# Patient Record
Sex: FEMALE | Race: OTHER | HISPANIC OR LATINO
[De-identification: names, ages, dates, MRNs, and addresses within clinical notes are randomized per-mention and may not be internally consistent; named-entity substitution may affect disease eponyms.]

---

## 2017-07-05 ENCOUNTER — TRANSCRIPTION ENCOUNTER (OUTPATIENT)
Age: 67
End: 2017-07-05

## 2018-07-23 ENCOUNTER — APPOINTMENT (OUTPATIENT)
Dept: OPHTHALMOLOGY | Facility: CLINIC | Age: 68
End: 2018-07-23
Payer: MEDICARE

## 2018-07-23 PROCEDURE — 92004 COMPRE OPH EXAM NEW PT 1/>: CPT

## 2018-12-03 ENCOUNTER — APPOINTMENT (OUTPATIENT)
Dept: OPHTHALMOLOGY | Facility: CLINIC | Age: 68
End: 2018-12-03
Payer: MEDICAID

## 2018-12-03 PROCEDURE — 92014 COMPRE OPH EXAM EST PT 1/>: CPT

## 2018-12-03 PROCEDURE — 92225: CPT | Mod: LT

## 2019-01-28 ENCOUNTER — INPATIENT (INPATIENT)
Facility: HOSPITAL | Age: 69
LOS: 4 days | Discharge: HOME | DRG: 301 | End: 2019-02-02
Attending: INTERNAL MEDICINE | Admitting: INTERNAL MEDICINE
Payer: MEDICARE

## 2019-01-28 VITALS
TEMPERATURE: 98 F | DIASTOLIC BLOOD PRESSURE: 77 MMHG | HEIGHT: 61 IN | WEIGHT: 199.96 LBS | SYSTOLIC BLOOD PRESSURE: 122 MMHG | RESPIRATION RATE: 18 BRPM | OXYGEN SATURATION: 99 % | HEART RATE: 90 BPM

## 2019-01-28 DIAGNOSIS — S99.919A UNSPECIFIED INJURY OF UNSPECIFIED ANKLE, INITIAL ENCOUNTER: ICD-10-CM

## 2019-01-28 DIAGNOSIS — R42 DIZZINESS AND GIDDINESS: ICD-10-CM

## 2019-01-28 DIAGNOSIS — Z29.9 ENCOUNTER FOR PROPHYLACTIC MEASURES, UNSPECIFIED: ICD-10-CM

## 2019-01-28 LAB
ALBUMIN SERPL ELPH-MCNC: 3.6 G/DL — SIGNIFICANT CHANGE UP (ref 3.5–5)
ALP SERPL-CCNC: 137 U/L — HIGH (ref 40–120)
ALT FLD-CCNC: 25 U/L DA — SIGNIFICANT CHANGE UP (ref 10–60)
ANION GAP SERPL CALC-SCNC: 8 MMOL/L — SIGNIFICANT CHANGE UP (ref 5–17)
ANION GAP SERPL CALC-SCNC: 8 MMOL/L — SIGNIFICANT CHANGE UP (ref 5–17)
APPEARANCE UR: CLEAR — SIGNIFICANT CHANGE UP
AST SERPL-CCNC: 42 U/L — HIGH (ref 10–40)
BASOPHILS # BLD AUTO: 0 K/UL — SIGNIFICANT CHANGE UP (ref 0–0.2)
BASOPHILS NFR BLD AUTO: 0.7 % — SIGNIFICANT CHANGE UP (ref 0–2)
BILIRUB SERPL-MCNC: 0.5 MG/DL — SIGNIFICANT CHANGE UP (ref 0.2–1.2)
BILIRUB UR-MCNC: NEGATIVE — SIGNIFICANT CHANGE UP
BUN SERPL-MCNC: 6 MG/DL — LOW (ref 7–18)
BUN SERPL-MCNC: 8 MG/DL — SIGNIFICANT CHANGE UP (ref 7–18)
CALCIUM SERPL-MCNC: 8 MG/DL — LOW (ref 8.4–10.5)
CALCIUM SERPL-MCNC: 8.8 MG/DL — SIGNIFICANT CHANGE UP (ref 8.4–10.5)
CHLORIDE SERPL-SCNC: 105 MMOL/L — SIGNIFICANT CHANGE UP (ref 96–108)
CHLORIDE SERPL-SCNC: 97 MMOL/L — SIGNIFICANT CHANGE UP (ref 96–108)
CO2 SERPL-SCNC: 23 MMOL/L — SIGNIFICANT CHANGE UP (ref 22–31)
CO2 SERPL-SCNC: 23 MMOL/L — SIGNIFICANT CHANGE UP (ref 22–31)
COLOR SPEC: YELLOW — SIGNIFICANT CHANGE UP
CREAT SERPL-MCNC: 0.5 MG/DL — SIGNIFICANT CHANGE UP (ref 0.5–1.3)
CREAT SERPL-MCNC: 0.66 MG/DL — SIGNIFICANT CHANGE UP (ref 0.5–1.3)
D DIMER BLD IA.RAPID-MCNC: 335 NG/ML DDU — HIGH
DIFF PNL FLD: NEGATIVE — SIGNIFICANT CHANGE UP
EOSINOPHIL # BLD AUTO: 0.3 K/UL — SIGNIFICANT CHANGE UP (ref 0–0.5)
EOSINOPHIL NFR BLD AUTO: 5.6 % — SIGNIFICANT CHANGE UP (ref 0–6)
FLU A RESULT: DETECTED
FLU A RESULT: DETECTED
FLUAV AG NPH QL: DETECTED
FLUBV AG NPH QL: SIGNIFICANT CHANGE UP
GLUCOSE SERPL-MCNC: 92 MG/DL — SIGNIFICANT CHANGE UP (ref 70–99)
GLUCOSE SERPL-MCNC: 97 MG/DL — SIGNIFICANT CHANGE UP (ref 70–99)
GLUCOSE UR QL: NEGATIVE — SIGNIFICANT CHANGE UP
HCT VFR BLD CALC: 41.4 % — SIGNIFICANT CHANGE UP (ref 34.5–45)
HGB BLD-MCNC: 13.9 G/DL — SIGNIFICANT CHANGE UP (ref 11.5–15.5)
KETONES UR-MCNC: NEGATIVE — SIGNIFICANT CHANGE UP
LEUKOCYTE ESTERASE UR-ACNC: NEGATIVE — SIGNIFICANT CHANGE UP
LIDOCAIN IGE QN: 118 U/L — SIGNIFICANT CHANGE UP (ref 73–393)
LYMPHOCYTES # BLD AUTO: 2.2 K/UL — SIGNIFICANT CHANGE UP (ref 1–3.3)
LYMPHOCYTES # BLD AUTO: 39.4 % — SIGNIFICANT CHANGE UP (ref 13–44)
MCHC RBC-ENTMCNC: 30 PG — SIGNIFICANT CHANGE UP (ref 27–34)
MCHC RBC-ENTMCNC: 33.5 GM/DL — SIGNIFICANT CHANGE UP (ref 32–36)
MCV RBC AUTO: 89.7 FL — SIGNIFICANT CHANGE UP (ref 80–100)
MONOCYTES # BLD AUTO: 0.4 K/UL — SIGNIFICANT CHANGE UP (ref 0–0.9)
MONOCYTES NFR BLD AUTO: 7.2 % — SIGNIFICANT CHANGE UP (ref 2–14)
NEUTROPHILS # BLD AUTO: 2.7 K/UL — SIGNIFICANT CHANGE UP (ref 1.8–7.4)
NEUTROPHILS NFR BLD AUTO: 47.1 % — SIGNIFICANT CHANGE UP (ref 43–77)
NITRITE UR-MCNC: NEGATIVE — SIGNIFICANT CHANGE UP
PH UR: 7 — SIGNIFICANT CHANGE UP (ref 5–8)
PLATELET # BLD AUTO: 288 K/UL — SIGNIFICANT CHANGE UP (ref 150–400)
POTASSIUM SERPL-MCNC: 3.9 MMOL/L — SIGNIFICANT CHANGE UP (ref 3.5–5.3)
POTASSIUM SERPL-MCNC: 5.6 MMOL/L — HIGH (ref 3.5–5.3)
POTASSIUM SERPL-SCNC: 3.9 MMOL/L — SIGNIFICANT CHANGE UP (ref 3.5–5.3)
POTASSIUM SERPL-SCNC: 5.6 MMOL/L — HIGH (ref 3.5–5.3)
PROT SERPL-MCNC: 7.7 G/DL — SIGNIFICANT CHANGE UP (ref 6–8.3)
PROT UR-MCNC: NEGATIVE — SIGNIFICANT CHANGE UP
RBC # BLD: 4.62 M/UL — SIGNIFICANT CHANGE UP (ref 3.8–5.2)
RBC # FLD: 12.2 % — SIGNIFICANT CHANGE UP (ref 10.3–14.5)
RSV RESULT: SIGNIFICANT CHANGE UP
RSV RNA RESP QL NAA+PROBE: SIGNIFICANT CHANGE UP
SODIUM SERPL-SCNC: 128 MMOL/L — LOW (ref 135–145)
SODIUM SERPL-SCNC: 136 MMOL/L — SIGNIFICANT CHANGE UP (ref 135–145)
SP GR SPEC: 1.01 — SIGNIFICANT CHANGE UP (ref 1.01–1.02)
TROPONIN I SERPL-MCNC: <0.015 NG/ML — SIGNIFICANT CHANGE UP (ref 0–0.04)
TSH SERPL-MCNC: 2.36 UU/ML — SIGNIFICANT CHANGE UP (ref 0.34–4.82)
UROBILINOGEN FLD QL: NEGATIVE — SIGNIFICANT CHANGE UP
WBC # BLD: 5.7 K/UL — SIGNIFICANT CHANGE UP (ref 3.8–10.5)
WBC # FLD AUTO: 5.7 K/UL — SIGNIFICANT CHANGE UP (ref 3.8–10.5)

## 2019-01-28 PROCEDURE — 93971 EXTREMITY STUDY: CPT | Mod: 26,RT

## 2019-01-28 PROCEDURE — 99285 EMERGENCY DEPT VISIT HI MDM: CPT

## 2019-01-28 PROCEDURE — 93010 ELECTROCARDIOGRAM REPORT: CPT

## 2019-01-28 PROCEDURE — 71045 X-RAY EXAM CHEST 1 VIEW: CPT | Mod: 26

## 2019-01-28 RX ORDER — SODIUM CHLORIDE 9 MG/ML
1000 INJECTION INTRAMUSCULAR; INTRAVENOUS; SUBCUTANEOUS
Qty: 0 | Refills: 0 | Status: DISCONTINUED | OUTPATIENT
Start: 2019-01-28 | End: 2019-01-31

## 2019-01-28 RX ORDER — PANTOPRAZOLE SODIUM 20 MG/1
40 TABLET, DELAYED RELEASE ORAL
Qty: 0 | Refills: 0 | Status: DISCONTINUED | OUTPATIENT
Start: 2019-01-28 | End: 2019-02-02

## 2019-01-28 RX ORDER — ACETAMINOPHEN 500 MG
650 TABLET ORAL EVERY 6 HOURS
Qty: 0 | Refills: 0 | Status: DISCONTINUED | OUTPATIENT
Start: 2019-01-28 | End: 2019-02-02

## 2019-01-28 RX ORDER — SODIUM CHLORIDE 9 MG/ML
1000 INJECTION INTRAMUSCULAR; INTRAVENOUS; SUBCUTANEOUS ONCE
Qty: 0 | Refills: 0 | Status: COMPLETED | OUTPATIENT
Start: 2019-01-28 | End: 2019-01-28

## 2019-01-28 RX ORDER — ONDANSETRON 8 MG/1
4 TABLET, FILM COATED ORAL ONCE
Qty: 0 | Refills: 0 | Status: COMPLETED | OUTPATIENT
Start: 2019-01-28 | End: 2019-01-28

## 2019-01-28 RX ORDER — LEVOTHYROXINE SODIUM 125 MCG
75 TABLET ORAL DAILY
Qty: 0 | Refills: 0 | Status: DISCONTINUED | OUTPATIENT
Start: 2019-01-28 | End: 2019-02-02

## 2019-01-28 RX ORDER — HEPARIN SODIUM 5000 [USP'U]/ML
5000 INJECTION INTRAVENOUS; SUBCUTANEOUS EVERY 8 HOURS
Qty: 0 | Refills: 0 | Status: DISCONTINUED | OUTPATIENT
Start: 2019-01-28 | End: 2019-01-29

## 2019-01-28 RX ORDER — ASPIRIN/CALCIUM CARB/MAGNESIUM 324 MG
81 TABLET ORAL DAILY
Qty: 0 | Refills: 0 | Status: DISCONTINUED | OUTPATIENT
Start: 2019-01-28 | End: 2019-02-01

## 2019-01-28 RX ADMIN — SODIUM CHLORIDE 1000 MILLILITER(S): 9 INJECTION INTRAMUSCULAR; INTRAVENOUS; SUBCUTANEOUS at 21:44

## 2019-01-28 RX ADMIN — SODIUM CHLORIDE 1000 MILLILITER(S): 9 INJECTION INTRAMUSCULAR; INTRAVENOUS; SUBCUTANEOUS at 17:24

## 2019-01-28 RX ADMIN — SODIUM CHLORIDE 1000 MILLILITER(S): 9 INJECTION INTRAMUSCULAR; INTRAVENOUS; SUBCUTANEOUS at 19:12

## 2019-01-28 RX ADMIN — ONDANSETRON 4 MILLIGRAM(S): 8 TABLET, FILM COATED ORAL at 17:24

## 2019-01-28 NOTE — ED PROVIDER NOTE - OBJECTIVE STATEMENT
69 y/o F with a significant PMHx of R ankle injury and no significant PSHx presents to the ED with c/o dizziness x 3 days and SOB, cough, nasal congestion, urinary frequency, low urinary output, nausea, vomiting, and diaphoresis x a few days. Pt states he feels like he's going to pass out. Pt's daughter notes pt had SOB x a few days ago. Pt's family says they suspected flu because pt had cough and nasal congestion, with associated urinary frequency and low urinary output despite adequate PO intake. Pt was diagnosed with a UTI prior to ankle injury but was never treated. Pt reports she also has had nausea, vomiting, and diaphoresis. Pt denies vertiginous sxs, chest pain, or any other complaints. Allergies: penicillin, iodine.

## 2019-01-28 NOTE — H&P ADULT - NSHPLABSRESULTS_GEN_ALL_CORE
CBC Full  -  ( 2019 17:17 )  WBC Count : 5.7 K/uL  Hemoglobin : 13.9 g/dL  Hematocrit : 41.4 %  Platelet Count - Automated : 288 K/uL  Mean Cell Volume : 89.7 fl  Mean Cell Hemoglobin : 30.0 pg  Mean Cell Hemoglobin Concentration : 33.5 gm/dL  Auto Neutrophil # : 2.7 K/uL  Auto Lymphocyte # : 2.2 K/uL  Auto Monocyte # : 0.4 K/uL  Auto Eosinophil # : 0.3 K/uL  Auto Basophil # : 0.0 K/uL  Auto Neutrophil % : 47.1 %  Auto Lymphocyte % : 39.4 %  Auto Monocyte % : 7.2 %  Auto Eosinophil % : 5.6 %  Auto Basophil % : 0.7 %        128<L>  |  97  |  8   ----------------------------<  97  5.6<H>   |  23  |  0.66    Ca    8.8      2019 17:17    TPro  7.7  /  Alb  3.6  /  TBili  0.5  /  DBili  x   /  AST  42<H>  /  ALT  25  /  AlkPhos  137<H>      Urinalysis Basic - ( 2019 16:59 )    Color: Yellow / Appearance: Clear / S.010 / pH: x  Gluc: x / Ketone: Negative  / Bili: Negative / Urobili: Negative   Blood: x / Protein: Negative / Nitrite: Negative   Leuk Esterase: Negative / RBC: x / WBC x   Sq Epi: x / Non Sq Epi: x / Bacteria: x    RADIOLOGY & ADDITIONAL STUDIES (The following images were personally reviewed):    EXAM:  XR CHEST AP OR PA 1V                          PROCEDURE DATE:  2019      INTERPRETATION:  Chest portable.    Clinical History: Shortness of breath.    Comparison: 1/3/2013.    Single AP view submitted.    The evaluation of the cardiomediastinal silhouette is limited on portable   technique.    Low lung volumes are present.  No focal consolidation, effusion or pneumothorax is noted.    Impression:    No acute pulmonary process demonstrated.    EXAM:  US DPLX LWR EXT VEINS LTD RT                          PROCEDURE DATE:  2019      INTERPRETATION:  CLINICAL STATEMENT: Swelling leg.    TECHNIQUE: Ultrasound of right lower extremity deep venous system.    COMPARISON: None.    FINDINGS:  There is color and spectral flow, compression and augmentation of the   common femoral, superficial femoral and popliteal veins.    There is flow in the posterior tibial vein.    IMPRESSION:  No evidence of DVT.

## 2019-01-28 NOTE — ED PROVIDER NOTE - MEDICAL DECISION MAKING DETAILS
67 y/o F with a PMHx of R ankle injury presents to the ED with SOB and dizziness x a few days. Will do cardiac w/u, check D-dimer, give fluids, check chest xray, UA, and culture.

## 2019-01-28 NOTE — ED PROVIDER NOTE - CHPI ED SYMPTOMS POS
DIZZINESS/nasal congestion, cough, urinary frequency, low urinary output, nausea, vomiting, diaphoresis/SHORTNESS OF BREATH

## 2019-01-28 NOTE — H&P ADULT - ASSESSMENT
Patient admitted for shortness of breath significant for possible URI vs pulmonary embolism given recent immobilization after ankle surgery. Patient has iodine contrast allergy, therefore will pursue V/Q scan.

## 2019-01-28 NOTE — H&P ADULT - HISTORY OF PRESENT ILLNESS
69 y/o F with a significant PMHx of R ankle injury and no significant PSHx presents to the ED with complaints of dizziness for 3 days accompanied by SOB, cough, nasal congestion, urinary frequency, low urinary output, nausea, vomiting, and diaphoresis. Pt states he feels like he's going to pass out. Pt's daughter notes pt had SOB x a few days ago. Pt's family says they suspected flu because pt had cough and nasal congestion, with associated urinary frequency and low urinary output despite adequate PO intake. Pt was diagnosed with a UTI prior to ankle injury, however was never treated. Pt reports she also has had nausea, vomiting, and diaphoresis. Pt denies vertiginous sxs, chest pain, or any other complaints. 69 y/o F with a significant PMHx of R ankle injury and no significant PSHx presents to the ED with complaints of dizziness for 3 days accompanied by SOB, cough, nasal congestion, urinary frequency, low urinary output, nausea, vomiting, and diaphoresis. Pt states he feels like he's going to pass out, with a warm sensation traveling from legs to her face. Pt's daughter notes pt had SOB x a few days ago. Pt's family says they suspected flu because pt had cough and nasal congestion, with associated urinary frequency and low urinary output despite adequate PO intake. Pt was diagnosed with a UTI prior to ankle injury, however was never treated. Pt reports she also has had nausea, vomiting, and diaphoresis. Pt denies vertiginous sxs, chest pain, or any other complaints.

## 2019-01-28 NOTE — H&P ADULT - NSHPPHYSICALEXAM_GEN_ALL_CORE
Vital Signs Last 24 Hrs  T(C): 36.7 (28 Jan 2019 19:39), Max: 36.9 (28 Jan 2019 16:03)  T(F): 98 (28 Jan 2019 19:39), Max: 98.4 (28 Jan 2019 16:03)  HR: 95 (28 Jan 2019 19:39) (90 - 95)  BP: 125/74 (28 Jan 2019 19:39) (122/77 - 125/74)  RR: 18 (28 Jan 2019 19:39) (18 - 18)  SpO2: 98% (28 Jan 2019 19:39) (98% - 99%)  ________________________________________________  PHYSICAL EXAM:  GENERAL: NAD  HEENT: Normocephalic;  conjunctivae and sclerae clear; moist mucous membranes  NECK : supple, no JVD  CHEST/LUNG: Clear to auscultation bilaterally with good air entry   HEART: S1 S2  regular; no murmurs, gallops or rubs  ABDOMEN: Soft, Nontender, Nondistended; Bowel sounds present  EXTREMITIES: no cyanosis; no edema; no calf tenderness  NERVOUS SYSTEM:  Awake and alert; Oriented  to place, person and time

## 2019-01-28 NOTE — H&P ADULT - PMH
Ankle injury  right ankle Ankle injury  right ankle  Chronic constipation    Diverticulosis    Gastritis    Hemorrhoids    Hernia    Hypothyroid    THORNE (nonalcoholic steatohepatitis)  stage 2

## 2019-01-28 NOTE — H&P ADULT - PROBLEM SELECTOR PLAN 6
elevated liver enzymes  f/u CMP in AM  dash diet  tylenol PRN only if liver enzymes wnl or trending down

## 2019-01-28 NOTE — H&P ADULT - PSH
No significant past surgical history H/O hysterectomy with unilateral oophorectomy    H/O:     History of ankle surgery  bilateral ankle surgery, 1 month ago had RLE ankle surgery

## 2019-01-28 NOTE — H&P ADULT - NSHPLANGTRANSLATORFT_GEN_A_CORE
Patient prefers daughter at bedside to translate Patient prefers daughter Fabienne at bedside to translate

## 2019-01-28 NOTE — H&P ADULT - PROBLEM SELECTOR PLAN 1
concern for DVT/PE given recent ankle surgery on RLE, near syncope, intermittent shortness of breath, however patient also has upper respiratory symptoms including cough, nasal congestion, and currently positive for influenza A  c/w tamiflu for now x 5 days  VQ scan to r/o PE given severe iodine contrast allergy, unable to obtain CTA chest  patient does not want to do prophylactic full dose anticoagulation given history of severe gastritis, therefore will wait until V/Q results - patient understands risks of foregoing full anticoagulation  f/u VQ scan in AM  f/u echocardiogram, bilateral carotid dopplers for near syncope event  c/w telemetry for now concern for DVT/PE given recent ankle surgery on RLE, near syncope, intermittent shortness of breath, however may also be vasovagal as patient is also positive for influenza A with active URI symptoms  -c/w tamiflu for now x 5 days, droplet precautions  -VQ scan to r/o PE given severe iodine contrast allergy, unable to obtain CTA chest  patient does not want to do prophylactic full dose anticoagulation given history of severe gastritis, therefore will wait until V/Q results - patient understands risks of foregoing full anticoagulation  -f/u VQ scan in AM  -f/u echocardiogram, bilateral carotid dopplers, for near syncope event  -c/w telemetry for now  -f/u cardiology - Dr. Villalobos

## 2019-01-28 NOTE — ED PROVIDER NOTE - PROGRESS NOTE DETAILS
Patient's D-Dimer elevated. unable to perform CTA due to Iodine allergy. Will perform RLE doppler to rule out DVT and put in for V/Q scan. Will admit to Dr. Munoz for further management. Patient signed out to Dr. Rodriguez.

## 2019-01-28 NOTE — ED ADULT NURSE NOTE - ED STAT RN HANDOFF DETAILS 2
Patient was endorsed to RN in no acute distress and hemodynamically stable. Patient was endorsed to ALISIA Barker in no acute distress and hemodynamically stable.

## 2019-01-29 DIAGNOSIS — K75.81 NONALCOHOLIC STEATOHEPATITIS (NASH): ICD-10-CM

## 2019-01-29 DIAGNOSIS — R09.89 OTHER SPECIFIED SYMPTOMS AND SIGNS INVOLVING THE CIRCULATORY AND RESPIRATORY SYSTEMS: ICD-10-CM

## 2019-01-29 DIAGNOSIS — E03.9 HYPOTHYROIDISM, UNSPECIFIED: ICD-10-CM

## 2019-01-29 DIAGNOSIS — K29.70 GASTRITIS, UNSPECIFIED, WITHOUT BLEEDING: ICD-10-CM

## 2019-01-29 DIAGNOSIS — K59.09 OTHER CONSTIPATION: ICD-10-CM

## 2019-01-29 DIAGNOSIS — Z98.891 HISTORY OF UTERINE SCAR FROM PREVIOUS SURGERY: Chronic | ICD-10-CM

## 2019-01-29 DIAGNOSIS — Z98.890 OTHER SPECIFIED POSTPROCEDURAL STATES: Chronic | ICD-10-CM

## 2019-01-29 DIAGNOSIS — J11.1 INFLUENZA DUE TO UNIDENTIFIED INFLUENZA VIRUS WITH OTHER RESPIRATORY MANIFESTATIONS: ICD-10-CM

## 2019-01-29 DIAGNOSIS — I26.99 OTHER PULMONARY EMBOLISM WITHOUT ACUTE COR PULMONALE: ICD-10-CM

## 2019-01-29 DIAGNOSIS — Z90.710 ACQUIRED ABSENCE OF BOTH CERVIX AND UTERUS: Chronic | ICD-10-CM

## 2019-01-29 LAB
24R-OH-CALCIDIOL SERPL-MCNC: 34.5 NG/ML — SIGNIFICANT CHANGE UP (ref 30–80)
ALBUMIN SERPL ELPH-MCNC: 3 G/DL — LOW (ref 3.5–5)
ALP SERPL-CCNC: 115 U/L — SIGNIFICANT CHANGE UP (ref 40–120)
ALT FLD-CCNC: 17 U/L DA — SIGNIFICANT CHANGE UP (ref 10–60)
ANION GAP SERPL CALC-SCNC: 8 MMOL/L — SIGNIFICANT CHANGE UP (ref 5–17)
AST SERPL-CCNC: 14 U/L — SIGNIFICANT CHANGE UP (ref 10–40)
BILIRUB SERPL-MCNC: 0.3 MG/DL — SIGNIFICANT CHANGE UP (ref 0.2–1.2)
BUN SERPL-MCNC: 6 MG/DL — LOW (ref 7–18)
CALCIUM SERPL-MCNC: 8.6 MG/DL — SIGNIFICANT CHANGE UP (ref 8.4–10.5)
CHLORIDE SERPL-SCNC: 104 MMOL/L — SIGNIFICANT CHANGE UP (ref 96–108)
CHOLEST SERPL-MCNC: 130 MG/DL — SIGNIFICANT CHANGE UP (ref 10–199)
CO2 SERPL-SCNC: 24 MMOL/L — SIGNIFICANT CHANGE UP (ref 22–31)
CREAT SERPL-MCNC: 0.61 MG/DL — SIGNIFICANT CHANGE UP (ref 0.5–1.3)
CULTURE RESULTS: SIGNIFICANT CHANGE UP
GLUCOSE SERPL-MCNC: 84 MG/DL — SIGNIFICANT CHANGE UP (ref 70–99)
HBA1C BLD-MCNC: 5.5 % — SIGNIFICANT CHANGE UP (ref 4–5.6)
HCT VFR BLD CALC: 34.9 % — SIGNIFICANT CHANGE UP (ref 34.5–45)
HCV AB S/CO SERPL IA: 0.13 S/CO — SIGNIFICANT CHANGE UP
HCV AB SERPL-IMP: SIGNIFICANT CHANGE UP
HDLC SERPL-MCNC: 53 MG/DL — SIGNIFICANT CHANGE UP
HGB BLD-MCNC: 11.7 G/DL — SIGNIFICANT CHANGE UP (ref 11.5–15.5)
LIPID PNL WITH DIRECT LDL SERPL: 71 MG/DL — SIGNIFICANT CHANGE UP
MAGNESIUM SERPL-MCNC: 2.1 MG/DL — SIGNIFICANT CHANGE UP (ref 1.6–2.6)
MCHC RBC-ENTMCNC: 29.8 PG — SIGNIFICANT CHANGE UP (ref 27–34)
MCHC RBC-ENTMCNC: 33.6 GM/DL — SIGNIFICANT CHANGE UP (ref 32–36)
MCV RBC AUTO: 88.6 FL — SIGNIFICANT CHANGE UP (ref 80–100)
PHOSPHATE SERPL-MCNC: 3.5 MG/DL — SIGNIFICANT CHANGE UP (ref 2.5–4.5)
PLATELET # BLD AUTO: 247 K/UL — SIGNIFICANT CHANGE UP (ref 150–400)
POTASSIUM SERPL-MCNC: 4.3 MMOL/L — SIGNIFICANT CHANGE UP (ref 3.5–5.3)
POTASSIUM SERPL-SCNC: 4.3 MMOL/L — SIGNIFICANT CHANGE UP (ref 3.5–5.3)
PROT SERPL-MCNC: 6.1 G/DL — SIGNIFICANT CHANGE UP (ref 6–8.3)
RBC # BLD: 3.94 M/UL — SIGNIFICANT CHANGE UP (ref 3.8–5.2)
RBC # FLD: 12 % — SIGNIFICANT CHANGE UP (ref 10.3–14.5)
SODIUM SERPL-SCNC: 136 MMOL/L — SIGNIFICANT CHANGE UP (ref 135–145)
SPECIMEN SOURCE: SIGNIFICANT CHANGE UP
TOTAL CHOLESTEROL/HDL RATIO MEASUREMENT: 2.5 RATIO — LOW (ref 3.3–7.1)
TRIGL SERPL-MCNC: 31 MG/DL — SIGNIFICANT CHANGE UP (ref 10–149)
TSH SERPL-MCNC: 2.01 UU/ML — SIGNIFICANT CHANGE UP (ref 0.34–4.82)
VIT B12 SERPL-MCNC: 1103 PG/ML — SIGNIFICANT CHANGE UP (ref 232–1245)
WBC # BLD: 4.7 K/UL — SIGNIFICANT CHANGE UP (ref 3.8–10.5)
WBC # FLD AUTO: 4.7 K/UL — SIGNIFICANT CHANGE UP (ref 3.8–10.5)

## 2019-01-29 PROCEDURE — 99223 1ST HOSP IP/OBS HIGH 75: CPT

## 2019-01-29 PROCEDURE — 93880 EXTRACRANIAL BILAT STUDY: CPT | Mod: 26

## 2019-01-29 PROCEDURE — 78582 LUNG VENTILAT&PERFUS IMAGING: CPT | Mod: 26

## 2019-01-29 RX ORDER — ENOXAPARIN SODIUM 100 MG/ML
90 INJECTION SUBCUTANEOUS EVERY 12 HOURS
Qty: 0 | Refills: 0 | Status: DISCONTINUED | OUTPATIENT
Start: 2019-01-30 | End: 2019-02-01

## 2019-01-29 RX ORDER — ENOXAPARIN SODIUM 100 MG/ML
90 INJECTION SUBCUTANEOUS EVERY 12 HOURS
Qty: 0 | Refills: 0 | Status: DISCONTINUED | OUTPATIENT
Start: 2019-01-29 | End: 2019-01-29

## 2019-01-29 RX ORDER — SENNA PLUS 8.6 MG/1
2 TABLET ORAL AT BEDTIME
Qty: 0 | Refills: 0 | Status: DISCONTINUED | OUTPATIENT
Start: 2019-01-29 | End: 2019-02-02

## 2019-01-29 RX ORDER — ENOXAPARIN SODIUM 100 MG/ML
90 INJECTION SUBCUTANEOUS ONCE
Qty: 0 | Refills: 0 | Status: COMPLETED | OUTPATIENT
Start: 2019-01-29 | End: 2019-01-29

## 2019-01-29 RX ORDER — ONDANSETRON 8 MG/1
4 TABLET, FILM COATED ORAL EVERY 8 HOURS
Qty: 0 | Refills: 0 | Status: DISCONTINUED | OUTPATIENT
Start: 2019-01-29 | End: 2019-02-02

## 2019-01-29 RX ORDER — ASPIRIN/CALCIUM CARB/MAGNESIUM 324 MG
1 TABLET ORAL
Qty: 0 | Refills: 0 | COMMUNITY

## 2019-01-29 RX ADMIN — Medication 100 MILLIGRAM(S): at 21:37

## 2019-01-29 RX ADMIN — ONDANSETRON 4 MILLIGRAM(S): 8 TABLET, FILM COATED ORAL at 18:27

## 2019-01-29 RX ADMIN — ENOXAPARIN SODIUM 90 MILLIGRAM(S): 100 INJECTION SUBCUTANEOUS at 23:14

## 2019-01-29 RX ADMIN — Medication 75 MILLIGRAM(S): at 18:23

## 2019-01-29 RX ADMIN — Medication 75 MICROGRAM(S): at 05:20

## 2019-01-29 RX ADMIN — SENNA PLUS 2 TABLET(S): 8.6 TABLET ORAL at 21:39

## 2019-01-29 RX ADMIN — PANTOPRAZOLE SODIUM 40 MILLIGRAM(S): 20 TABLET, DELAYED RELEASE ORAL at 05:21

## 2019-01-29 RX ADMIN — ENOXAPARIN SODIUM 90 MILLIGRAM(S): 100 INJECTION SUBCUTANEOUS at 11:00

## 2019-01-29 RX ADMIN — Medication 75 MILLIGRAM(S): at 05:21

## 2019-01-29 NOTE — CONSULT NOTE ADULT - SUBJECTIVE AND OBJECTIVE BOX
Patient is a 68y old  Female who presents with a chief complaint of near syncope, cough, SOB (2019 10:53)     History of Present Illness:  Reason for Admission: near syncope, cough, SOB	  History of Present Illness: 	  69 y/o F with a significant PMHx of R ankle injury and no significant PSHx presents to the ED with complaints of dizziness for 3 days accompanied by SOB, cough, nasal congestion, urinary frequency, low urinary output, nausea, vomiting, and diaphoresis. Pt states he feels like he's going to pass out, with a warm sensation traveling from legs to her face. Pt's daughter notes pt had SOB x a few days ago. Pt's family says they suspected flu because pt had cough and nasal congestion, with associated urinary frequency and low urinary output despite adequate PO intake. Pt was diagnosed with a UTI prior to ankle injury, however was never treated. Pt reports she also has had nausea, vomiting, and diaphoresis. Pt denies vertiginous sxs, chest pain, or any other complaints.      History of Present Illness: As above ,awake ,alert ,lying in bed . C/o cough and sob .       INTERVAL HPI/OVERNIGHT EVENTS:  T(C): 36.7 (19 @ 11:09), Max: 37.1 (19 @ 00:06)  HR: 59 (19 @ 11:09) (57 - 95)  BP: 112/80 (19 @ 11:09) (112/80 - 125/74)  RR: 19 (19 @ 11:09) (18 - 19)  SpO2: 99% (19 @ 11:09) (93% - 99%)  Wt(kg): --  I&O's Summary      PAST MEDICAL & SURGICAL HISTORY:  THORNE (nonalcoholic steatohepatitis): stage 2  Chronic constipation  Hemorrhoids  Diverticulosis  Gastritis  Hernia  Hypothyroid  Ankle injury: right ankle  History of ankle surgery: bilateral ankle surgery, 1 month ago had RLE ankle surgery  H/O:   H/O hysterectomy with unilateral oophorectomy      SOCIAL HISTORY  Alcohol:  Tobacco:  Illicit substance use:      FAMILY HISTORY:      LABS:                        11.7   4.7   )-----------( 247      ( 2019 06:36 )             34.9         136  |  104  |  6<L>  ----------------------------<  84  4.3   |  24  |  0.61    Ca    8.6      2019 10:03  Phos  3.5       Mg     2.1         TPro  6.1  /  Alb  3.0<L>  /  TBili  0.3  /  DBili  x   /  AST  14  /  ALT  17  /  AlkPhos  115        Urinalysis Basic - ( 2019 16:59 )    Color: Yellow / Appearance: Clear / S.010 / pH: x  Gluc: x / Ketone: Negative  / Bili: Negative / Urobili: Negative   Blood: x / Protein: Negative / Nitrite: Negative   Leuk Esterase: Negative / RBC: x / WBC x   Sq Epi: x / Non Sq Epi: x / Bacteria: x      CAPILLARY BLOOD GLUCOSE      POCT Blood Glucose.: 105 mg/dL (2019 16:02)        Urinalysis Basic - ( 2019 16:59 )    Color: Yellow / Appearance: Clear / S.010 / pH: x  Gluc: x / Ketone: Negative  / Bili: Negative / Urobili: Negative   Blood: x / Protein: Negative / Nitrite: Negative   Leuk Esterase: Negative / RBC: x / WBC x   Sq Epi: x / Non Sq Epi: x / Bacteria: x        MEDICATIONS  (STANDING):  aspirin enteric coated 81 milliGRAM(s) Oral daily  enoxaparin Injectable 90 milliGRAM(s) SubCutaneous every 12 hours  levothyroxine 75 MICROGram(s) Oral daily  oseltamivir 75 milliGRAM(s) Oral every 12 hours  pantoprazole    Tablet 40 milliGRAM(s) Oral before breakfast  senna 2 Tablet(s) Oral at bedtime  sodium chloride 0.9%. 1000 milliLiter(s) (75 mL/Hr) IV Continuous <Continuous>    MEDICATIONS  (PRN):  acetaminophen   Tablet .. 650 milliGRAM(s) Oral every 6 hours PRN Moderate Pain (4 - 6)      REVIEW OF SYSTEMS:  CONSTITUTIONAL: No fever, weight loss, or fatigue  EYES: No eye pain, visual disturbances, or discharge  ENMT:  No difficulty hearing, tinnitus, vertigo; No sinus or throat pain  NECK: No pain or stiffness  RESPIRATORY: No cough, wheezing, chills or hemoptysis; No shortness of breath  CARDIOVASCULAR: No chest pain, palpitations, dizziness, or leg swelling  GASTROINTESTINAL: No abdominal or epigastric pain. No nausea, vomiting, or hematemesis; No diarrhea or constipation. No melena or hematochezia.  GENITOURINARY: No dysuria, frequency, hematuria, or incontinence  NEUROLOGICAL: No headaches, memory loss, loss of strength, numbness, or tremors  SKIN: No itching, burning, rashes, or lesions   LYMPH NODES: No enlarged glands  ENDOCRINE: No heat or cold intolerance; No hair loss  MUSCULOSKELETAL: No joint pain or swelling; No muscle, back, or extremity pain  PSYCHIATRIC: No depression, anxiety, mood swings, or difficulty sleeping  HEME/LYMPH: No easy bruising, or bleeding gums  ALLERY AND IMMUNOLOGIC: No hives or eczema    PHYSICAL EXAM:  GENERAL: NAD, well-groomed, well-developed  HEAD:  Atraumatic, Normocephalic  EYES: EOMI, PERRLA, conjunctiva and sclera clear  ENMT: No tonsillar erythema, exudates, or enlargement; Moist mucous membranes, Good dentition, No lesions  NECK: Supple, No JVD, Normal thyroid  NERVOUS SYSTEM:  Alert & Oriented X3, Good concentration; Motor Strength 5/5 B/L upper and lower extremities; DTRs 2+ intact and symmetric  CHEST/LUNG: Clear to percussion bilaterally; No rales, rhonchi, wheezing, or rubs  HEART: Regular rate and rhythm; No murmurs, rubs, or gallops  ABDOMEN: Soft, Nontender, Nondistended; Bowel sounds present  EXTREMITIES:  2+ Peripheral Pulses, No clubbing, cyanosis, or edema  LYMPH: No lymphadenopathy noted  SKIN: No rashes or lesions    RADIOLOGY & ADDITIONAL TESTS:    Imaging Personally Reviewed:  [x ] YES  [ ] NO    Consultant(s) Notes Reviewed:  [x ] YES  [ ] NO        Care Discussed with Consultants/Other Providers [x ] YES  [ ] NO Patient is a 68y old  Female who presents with a chief complaint of near syncope, cough, SOB (2019 10:53)     History of Present Illness:  Reason for Admission: near syncope, cough, SOB	  History of Present Illness: 	  67 y/o F with a significant PMHx of R ankle injury and no significant PSHx presents to the ED with complaints of dizziness for 3 days accompanied by SOB, cough, nasal congestion, urinary frequency, low urinary output, nausea, vomiting, and diaphoresis. Pt states he feels like he's going to pass out, with a warm sensation traveling from legs to her face. Pt's daughter notes pt had SOB x a few days ago. Pt's family says they suspected flu because pt had cough and nasal congestion, with associated urinary frequency and low urinary output despite adequate PO intake. Pt was diagnosed with a UTI prior to ankle injury, however was never treated. Pt reports she also has had nausea, vomiting, and diaphoresis. Pt denies vertiginous sxs, chest pain, or any other complaints.      History of Present Illness: As above ,awake ,alert ,lying in bed . C/o cough and sob .       INTERVAL HPI/OVERNIGHT EVENTS:  T(C): 36.7 (19 @ 11:09), Max: 37.1 (19 @ 00:06)  HR: 59 (19 @ 11:09) (57 - 95)  BP: 112/80 (19 @ 11:09) (112/80 - 125/74)  RR: 19 (19 @ 11:09) (18 - 19)  SpO2: 99% (19 @ 11:09) (93% - 99%)  Wt(kg): --  I&O's Summary      PAST MEDICAL & SURGICAL HISTORY:  THORNE (nonalcoholic steatohepatitis): stage 2  Chronic constipation  Hemorrhoids  Diverticulosis  Gastritis  Hernia  Hypothyroid  Ankle injury: right ankle  History of ankle surgery: bilateral ankle surgery, 1 month ago had RLE ankle surgery  H/O:   H/O hysterectomy with unilateral oophorectomy      SOCIAL HISTORY  Alcohol:  Tobacco:  Illicit substance use:      FAMILY HISTORY:      LABS:                        11.7   4.7   )-----------( 247      ( 2019 06:36 )             34.9         136  |  104  |  6<L>  ----------------------------<  84  4.3   |  24  |  0.61    Ca    8.6      2019 10:03  Phos  3.5       Mg     2.1         TPro  6.1  /  Alb  3.0<L>  /  TBili  0.3  /  DBili  x   /  AST  14  /  ALT  17  /  AlkPhos  115        Urinalysis Basic - ( 2019 16:59 )    Color: Yellow / Appearance: Clear / S.010 / pH: x  Gluc: x / Ketone: Negative  / Bili: Negative / Urobili: Negative   Blood: x / Protein: Negative / Nitrite: Negative   Leuk Esterase: Negative / RBC: x / WBC x   Sq Epi: x / Non Sq Epi: x / Bacteria: x      CAPILLARY BLOOD GLUCOSE      POCT Blood Glucose.: 105 mg/dL (2019 16:02)        Urinalysis Basic - ( 2019 16:59 )    Color: Yellow / Appearance: Clear / S.010 / pH: x  Gluc: x / Ketone: Negative  / Bili: Negative / Urobili: Negative   Blood: x / Protein: Negative / Nitrite: Negative   Leuk Esterase: Negative / RBC: x / WBC x   Sq Epi: x / Non Sq Epi: x / Bacteria: x        MEDICATIONS  (STANDING):  aspirin enteric coated 81 milliGRAM(s) Oral daily  enoxaparin Injectable 90 milliGRAM(s) SubCutaneous every 12 hours  levothyroxine 75 MICROGram(s) Oral daily  oseltamivir 75 milliGRAM(s) Oral every 12 hours  pantoprazole    Tablet 40 milliGRAM(s) Oral before breakfast  senna 2 Tablet(s) Oral at bedtime  sodium chloride 0.9%. 1000 milliLiter(s) (75 mL/Hr) IV Continuous <Continuous>    MEDICATIONS  (PRN):  acetaminophen   Tablet .. 650 milliGRAM(s) Oral every 6 hours PRN Moderate Pain (4 - 6)      REVIEW OF SYSTEMS:  CONSTITUTIONAL: No fever, weight loss, or fatigue  EYES: No eye pain, visual disturbances, or discharge  ENMT:  No difficulty hearing, tinnitus, vertigo; No sinus or throat pain  NECK: No pain or stiffness  RESPIRATORY: No cough, wheezing, chills or hemoptysis; No shortness of breath  CARDIOVASCULAR: No chest pain, palpitations, dizziness, or leg swelling  GASTROINTESTINAL: No abdominal or epigastric pain. No nausea, vomiting, or hematemesis; No diarrhea or constipation. No melena or hematochezia.  GENITOURINARY: No dysuria, frequency, hematuria, or incontinence  NEUROLOGICAL: No headaches, memory loss, loss of strength, numbness, or tremors  SKIN: No itching, burning, rashes, or lesions   LYMPH NODES: No enlarged glands  ENDOCRINE: No heat or cold intolerance; No hair loss  MUSCULOSKELETAL: No joint pain or swelling; No muscle, back, or extremity pain  PSYCHIATRIC: No depression, anxiety, mood swings, or difficulty sleeping  HEME/LYMPH: No easy bruising, or bleeding gums  ALLERY AND IMMUNOLOGIC: No hives or eczema    PHYSICAL EXAM:  GENERAL: NAD, well-groomed, well-developed  HEAD:  Atraumatic, Normocephalic  EYES: EOMI, PERRLA, conjunctiva and sclera clear  ENMT: No tonsillar erythema, exudates, or enlargement; Moist mucous membranes, Good dentition, No lesions  NECK: Supple, No JVD, Normal thyroid  NERVOUS SYSTEM:  Alert & Oriented X3, Good concentration; Motor Strength 5/5 B/L upper and lower extremities; DTRs 2+ intact and symmetric. right leg cast  CHEST/LUNG: Clear to percussion bilaterally; No rales, rhonchi, wheezing, or rubs  HEART: Regular rate and rhythm; No murmurs, rubs, or gallops  ABDOMEN: Soft, Nontender, Nondistended; Bowel sounds present  EXTREMITIES:  2+ Peripheral Pulses, No clubbing, cyanosis, or edema  LYMPH: No lymphadenopathy noted  SKIN: No rashes or lesions    RADIOLOGY & ADDITIONAL TESTS:  < from: NM Pulmonary Ventilation/Perfusion Scan (19 @ 08:55) >    IMPRESSION: Abnormal ventilation/perfusion lung scan. High probability of   pulmonary embolus.    < end of copied text >    Imaging Personally Reviewed:  [x ] YES  [ ] NO    Consultant(s) Notes Reviewed:  [x ] YES  [ ] NO        Care Discussed with Consultants/Other Providers [x ] YES  [ ] NO

## 2019-01-29 NOTE — CONSULT NOTE ADULT - PROBLEM SELECTOR RECOMMENDATION 9
tamiflu for now x 5 days, droplet precautions  VQ scan to r/o PE given severe iodine contrast allergy, unable to obtain   VQ scan in AM  echocardiogram  telemetry    cardiology consult anticoagulation  hem/onc eval

## 2019-01-29 NOTE — CONSULT NOTE ADULT - PROBLEM SELECTOR RECOMMENDATION 2
RLE elevation while in bed, NWB  f/u physical therapy consult. tamiflu for now x 5 days, droplet precautions  anticoagulation  VQ scan showed high probability for PE  echocardiogram  telemetry    cardiology consult

## 2019-01-29 NOTE — PHYSICAL THERAPY INITIAL EVALUATION ADULT - GENERAL OBSERVATIONS, REHAB EVAL
Patient received supine in bed, alert and awake, +telemonitoring, NAD, Right LE cast,  daughter by her side.

## 2019-01-29 NOTE — ED ADULT NURSE REASSESSMENT NOTE - NS ED NURSE REASSESS COMMENT FT1
Patient remains hemodynamically stable and in no acute distress, speaking in full sentences and breathing comfortably in room air. Patient denies any medical complaints. On Box A- Tele room notified.

## 2019-01-29 NOTE — PHYSICAL THERAPY INITIAL EVALUATION ADULT - CRITERIA FOR SKILLED THERAPEUTIC INTERVENTIONS
functional limitations in following categories/impairments found/anticipated equipment needs at discharge/anticipated discharge recommendation/risk reduction/prevention/predicted duration of therapy intervention/rehab potential/therapy frequency

## 2019-01-29 NOTE — PHYSICAL THERAPY INITIAL EVALUATION ADULT - ADDITIONAL COMMENTS
Prior to Right ankle surgery patient was IND with ambulation with no AD. Post surgery, patient has been minimally ambulatory, using wheelchair and using RW to perform transfers and short distance amb.

## 2019-01-29 NOTE — PHYSICAL THERAPY INITIAL EVALUATION ADULT - LIVES WITH, PROFILE
lives with daughter, apt, 4th floor, elevator access, 2 steps to enter home, HHA 3hrs/day, 5days/week/children

## 2019-01-29 NOTE — PROGRESS NOTE ADULT - SUBJECTIVE AND OBJECTIVE BOX
69 y/o F with a significant PMHx of R ankle injury and no significant PSHx presents to the ED with complaints of dizziness for 3 days accompanied by SOB, cough, nasal congestion, urinary frequency, low urinary output, nausea, vomiting, and diaphoresis. Pt states he feels like he's going to pass out, with a warm sensation traveling from legs to her face. Pt's daughter notes pt had SOB x a few days ago. Pt's family says they suspected flu because pt had cough and nasal congestion, with associated urinary frequency and low urinary output despite adequate PO intake. Pt was diagnosed with a UTI prior to ankle injury, however was never treated. Pt reports she also has had nausea, vomiting, and diaphoresis. Pt denies vertiginous sxs, chest pain, or any other complaints.   Pt is Flu A + on Tamiflu  Received call from Dr Munson who stated the pt is a high risk for + PE RUL on VQ scan. Pt has had ECHO and carotid dopplers as well which are pending reading. Pt also mentioned a possible CNS Aneurysm. D/W Dr Lucas and Dr Woods.  Pt may require Head CT with IV contrast and may need to have IV contrast with pretreatment protocol placed on chart.

## 2019-01-29 NOTE — CONSULT NOTE ADULT - SUBJECTIVE AND OBJECTIVE BOX
CHIEF COMPLAINT:Patient is a 68y old  Female who presents with a chief complaint of near syncope, cough, SOB .      HPI:  69 y/o F with a significant PMHx of R ankle injury and no significant PSHx presents to the ED with complaints of dizziness for 3 days accompanied by SOB, cough, nasal congestion, urinary frequency, low urinary output, nausea, vomiting, and diaphoresis. Pt states he feels like he's going to pass out, with a warm sensation traveling from legs to her face. Pt's daughter notes pt had SOB x a few days ago. Pt's family says they suspected flu because pt had cough and nasal congestion, with associated urinary frequency and low urinary output despite adequate PO intake. Pt was diagnosed with a UTI prior to ankle injury, however was never treated. Pt reports she also has had nausea, vomiting, and diaphoresis. Pt denies vertiginous sxs, chest pain, or any other complaints. (2019 21:22)      PAST MEDICAL & SURGICAL HISTORY:  THORNE (nonalcoholic steatohepatitis): stage 2  Chronic constipation  Hemorrhoids  Diverticulosis  Gastritis  Hernia  Hypothyroid  Ankle injury: right ankle  History of ankle surgery: bilateral ankle surgery, 1 month ago had RLE ankle surgery  H/O:   H/O hysterectomy with unilateral oophorectomy  CNS aneurysm    MEDICATIONS  (STANDING):  aspirin enteric coated 81 milliGRAM(s) Oral daily  enoxaparin Injectable 90 milliGRAM(s) SubCutaneous every 12 hours  levothyroxine 75 MICROGram(s) Oral daily  oseltamivir 75 milliGRAM(s) Oral every 12 hours  pantoprazole    Tablet 40 milliGRAM(s) Oral before breakfast  senna 2 Tablet(s) Oral at bedtime  sodium chloride 0.9%. 1000 milliLiter(s) (75 mL/Hr) IV Continuous <Continuous>    MEDICATIONS  (PRN):  acetaminophen   Tablet .. 650 milliGRAM(s) Oral every 6 hours PRN Moderate Pain (4 - 6)  ondansetron   Solution 4 milliGRAM(s) Oral every 8 hours PRN Nausea      FAMILY HISTORY:  Family history unknown      SOCIAL HISTORY:    [ x] Non-smoker    [x ] Alcohol-denies    Allergies    iodine (Angioedema)  penicillin (Rash)    Intolerances    	    REVIEW OF SYSTEMS:  CONSTITUTIONAL: No fever, weight loss, or fatigue  EYES: No eye pain, visual disturbances, or discharge  ENT:  No difficulty hearing, tinnitus, vertigo; No sinus or throat pain  NECK: No pain or stiffness  RESPIRATORY: No cough, wheezing, chills or hemoptysis; + Shortness of Breath  CARDIOVASCULAR: No chest pain, palpitations,+ passing out, dizziness, or leg swelling  GASTROINTESTINAL: No abdominal or epigastric pain. No nausea, vomiting, or hematemesis; No diarrhea or constipation. No melena or hematochezia.  GENITOURINARY: No dysuria, frequency, hematuria, or incontinence  NEUROLOGICAL: No headaches, memory loss, loss of strength, numbness, or tremors  SKIN: No itching, burning, rashes, or lesions   LYMPH Nodes: No enlarged glands  ENDOCRINE: No heat or cold intolerance; No hair loss  MUSCULOSKELETAL: No joint pain or swelling; No muscle, back, or extremity pain  PSYCHIATRIC: No depression, anxiety, mood swings, or difficulty sleeping  HEME/LYMPH: No easy bruising, or bleeding gums  ALLERGY AND IMMUNOLOGIC: No hives or eczema	      PHYSICAL EXAM:  T(C): 36.7 (19 @ 11:09), Max: 37.1 (19 @ 00:06)  HR: 59 (19 @ 11:09) (57 - 95)  BP: 112/80 (19 @ 11:09) (112/80 - 125/74)  RR: 19 (19 @ 11:09) (18 - 19)  SpO2: 99% (19 @ 11:09) (93% - 99%)  Wt(kg): --  I&O's Summary      Appearance: Normal	  HEENT:   Normal oral mucosa, PERRL, EOMI	  Lymphatic: No lymphadenopathy  Cardiovascular: Normal S1 S2, No JVD, No murmurs, No edema  Respiratory: Lungs clear to auscultation	  Psychiatry: A & O x 3, Mood & affect appropriate  Gastrointestinal:  Soft, Non-tender, + BS	  Skin: No rashes, No ecchymoses, No cyanosis	  Neurologic: Non-focal  Extremities: Normal range of motion, No clubbing, cyanosis or edema  Vascular: Peripheral pulses palpable 2+ bilaterally    	    ECG:  	not in chart  	  	  LABS:	 	    CARDIAC MARKERS:  CARDIAC MARKERS ( 2019 17:17 )  <0.015 ng/mL / x     / x     / x     / x                                  11.7   4.7   )-----------( 247      ( 2019 06:36 )             34.9         136  |  104  |  6<L>  ----------------------------<  84  4.3   |  24  |  0.61    Ca    8.6      2019 10:03  Phos  3.5       Mg     2.1         TPro  6.1  /  Alb  3.0<L>  /  TBili  0.3  /  DBili  x   /  AST  14  /  ALT  17  /  AlkPhos  115        Lipid Profile: Cholesterol 130  LDL 71  HDL 53  TG 31    HgA1c: Hemoglobin A1C, Whole Blood: 5.5 % ( @ 09:13)    TSH: Thyroid Stimulating Hormone, Serum: 2.01 uU/mL ( @ 06:36)  Thyroid Stimulating Hormone, Serum: 2.36 uU/mL ( @ 20:37)      FLU A B RSV Detection by PCR (19 @ 21:49)    Flu A Result: Detected: The Flu A B RSV assay is a Real-Time PCR test for the qualitative  detection and differentiation of Influenza A, Influenza B, and  Respiratory Syncytial Virus on nasopharyngeal swabs. The results should  be interpreted in the context of all clinical and laboratory findings.    Flu B Result: [facility]    RSV Result: NotDete    EXAM:  US DPLX CAROTIDS COMPL BI                            PROCEDURE DATE:  2019          INTERPRETATION:  Carotid Duplex Doppler Ultrasound    HISTORY: Dizziness    TECHNIQUE: Multiple grayscale and color Doppler ultrasound images of the   carotid and vertebral arteries were obtained.    Interpretation: There is bilateral intimal wall thickening involving the   common carotid arteries.     Blood flow velocities (cm/sec): (Normal is less than 125)  Right: Proximal CCA=75  Distal CCA=65  Prox ICA=73 Distal ICA=55    ECA=68  ICA/CCA ratio=1.1 (Normal= less than 2)    Left: Proximal CCA=97    Distal CCA=56  Prox ICA=62  Distal ICA=75        ECA=58  ICA/CCA ratio=1.3 (Normal= less than 2)    The right vertebral artery shows antegrade flow. The left vertebral   artery shows antegrade flow.      IMPRESSION:  All peak systolic and end diastolic velocities are within   normal limits. No sonographic evidence of hemodynamically significant   stenosis.    Thank you for the courtesy of this referral.        EXAM:  NM PULM VENTILATION PERFUS IMG                            PROCEDURE DATE:  2019          INTERPRETATION:  CLINICAL INFORMATION: 68 year-old female with shortness   of breath and dizziness, status post ankle surgery, referred to evaluate   for pulmonary embolism.    RADIOPHARMACEUTICAL: 1 mCi Tc-99m-DTPA by inhalation; 6.2 mCi Tc-99m-MAA,   I.V.    TECHNIQUE:  Ventilation and perfusion images of the lungs were obtained   following administration of Tc-99m-DTPA and Tc-99m-MAA. Imageswere   obtained in the anterior, posterior, both lateral, and all 4 oblique   projections. This study was interpreted in conjunction with a chest   radiograph of 2019.    COMPARISON: No previous lung V/Q scan for comparison.    FINDINGS: There are segmental mismatched ventilation/perfusion defects   involving the lateral basal segment of the right lower lobe, anterior   segment of the right upper lobe and possibly in the apical posterior   segment of the left upper lobe. There is heterogeneous radiotracer   distribution in the lungs on both the ventilation and the perfusion   images.       IMPRESSION: Abnormal ventilation/perfusion lung scan. High probability of   pulmonary embolus.      EXAM:  US DPLX LWR EXT VEINS LTD RT                            PROCEDURE DATE:  2019          INTERPRETATION:  CLINICAL STATEMENT: Swelling leg.    TECHNIQUE: Ultrasound of right lower extremity deep venous system.    COMPARISON: None.    FINDINGS:  There is color and spectral flow, compression and augmentation of the   common femoral, superficial femoral and popliteal veins.    There is flow in the posterior tibial vein.    IMPRESSION:  No evidence of DVT.

## 2019-01-29 NOTE — CONSULT NOTE ADULT - PROBLEM SELECTOR RECOMMENDATION 6
elevated liver enzymes  f/u CMP in AM  dash diet  tylenol PRN only if liver enzymes wnl or trending down. yovani nunn

## 2019-01-29 NOTE — CONSULT NOTE ADULT - PROBLEM SELECTOR RECOMMENDATION 7
DVT PPX elevated liver enzymes  f/u CMP in AM  dash diet  tylenol PRN only if liver enzymes wnl or trending down.

## 2019-01-29 NOTE — CONSULT NOTE ADULT - SUBJECTIVE AND OBJECTIVE BOX
No physical evidence of aneurysm, must rely on patient report for now.  Patient reports that diameter is 4mm, and has shrunk in size since first identified 5 years ago at [facility]  Patient declining CT Angio with pretreatment, and unable to get MRI so soon after right ankle surgery with hardware placement.  Rec: Get Dr. Willam Zimmer's most recent note and MRI or MRA report from summer 2018 to document aneurysmal size  Based on patient report, benefit of therapeutic anticoagulation to treat DVT/PE outweighs risk of intracranial bleed given small size of aneurysm.  If there is change in mental status, please get urgent CT Head.    NOTE TO BE COMPLETED    Neurology Consult    Patient is a 68y old  Female who presents with a chief complaint of near syncope, cough, SOB (2019 11:42)      HPI:  67 y/o F with a significant PMHx of R ankle injury and no significant PSHx presents to the ED with complaints of dizziness for 3 days accompanied by SOB, cough, nasal congestion, urinary frequency, low urinary output, nausea, vomiting, and diaphoresis. Pt states he feels like he's going to pass out, with a warm sensation traveling from legs to her face. Pt's daughter notes pt had SOB x a few days ago. Pt's family says they suspected flu because pt had cough and nasal congestion, with associated urinary frequency and low urinary output despite adequate PO intake. Pt was diagnosed with a UTI prior to ankle injury, however was never treated. Pt reports she also has had nausea, vomiting, and diaphoresis. Pt denies vertiginous sxs, chest pain, or any other complaints. (2019 21:22)      PAST MEDICAL & SURGICAL HISTORY:  THORNE (nonalcoholic steatohepatitis): stage 2  Chronic constipation  Hemorrhoids  Diverticulosis  Gastritis  Hernia  Hypothyroid  Ankle injury: right ankle  History of ankle surgery: bilateral ankle surgery, 1 month ago had RLE ankle surgery  H/O:   H/O hysterectomy with unilateral oophorectomy      FAMILY HISTORY:  Family history unknown      Social History: (-) x 3    Allergies    iodine (Angioedema)  penicillin (Rash)    Intolerances        MEDICATIONS  (STANDING):  aspirin enteric coated 81 milliGRAM(s) Oral daily  enoxaparin Injectable 90 milliGRAM(s) SubCutaneous every 12 hours  levothyroxine 75 MICROGram(s) Oral daily  oseltamivir 75 milliGRAM(s) Oral every 12 hours  pantoprazole    Tablet 40 milliGRAM(s) Oral before breakfast  senna 2 Tablet(s) Oral at bedtime  sodium chloride 0.9%. 1000 milliLiter(s) (75 mL/Hr) IV Continuous <Continuous>    MEDICATIONS  (PRN):  acetaminophen   Tablet .. 650 milliGRAM(s) Oral every 6 hours PRN Moderate Pain (4 - 6)  ondansetron   Solution 4 milliGRAM(s) Oral every 8 hours PRN Nausea      Review of systems:    Constitutional: No fever, weight loss or fatigue    Eyes: No eye pain or discharge  ENMT:  No difficulty hearing; No sinus or throat pain  Neck: No pain or stiffness  Respiratory: No cough, wheezing, chills or hemoptysis  Cardiovascular: No chest pain, palpitations, shortness of breath, dyspnea on exertion  Gastrointestinal: No abdominal pain, nausea, vomiting or hematemesis; No diarrhea or constipation.   Genitourinary: No dysuria, frequency, hematuria or incontinence  Neurological: As per HPI  Skin: No rashes or lesions   Endocrine: No heat or cold intolerance; No hair loss  Musculoskeletal: No joint pain or swelling  Psychiatric: No depression, anxiety, mood swings  Heme/Lymph: No easy bruising or bleeding gums    Vital Signs Last 24 Hrs  T(C): 36.7 (2019 11:09), Max: 37.1 (2019 00:06)  T(F): 98.1 (2019 11:09), Max: 98.7 (2019 00:06)  HR: 59 (2019 11:09) (57 - 95)  BP: 112/80 (2019 11:09) (112/80 - 125/74)  BP(mean): --  RR: 19 (2019 11:09) (18 - 19)  SpO2: 99% (2019 11:09) (93% - 99%)    Neurologic Examination:  General:  Appearance is consistent with chronologic age.  No abnormal facies.   General: The patient is oriented to person, place, time and date.  Recent and remote memory intact.  Fund of knowledge is intact and normal.  Language with normal repetition, comprehension and naming.  Nondysarthric.    Cranial nerves: intact VA, VFF.  EOMI w/o nystagmus, skew or reported double vision.  PERRL.  No ptosis/weakness of eyelid closure.  Facial sensation is normal with normal bite.  No facial asymmetry.  Hearing grossly intact b/l.  Palate elevates midline.  Tongue midline.  Motor examination:   Normal tone, bulk and range of motion.  No tenderness, twitching, tremors or involuntary movements.  Formal Muscle Strength Testing: (MRC grade R/L) 5/5 UE; 5/5 LE.  No observable drift.  Reflexes:   2+ b/l pectoralis, biceps, triceps, brachioradialis, patella and Achilles.  Plantar response downgoing b/l.  Jaw jerk, Belle, clonus absent.  Sensory examination:   Intact to light touch and pinprick, pain, temperature and proprioception and vibration in all extremities.  Cerebellum:   FTN/HKS intact with normal DARIA in all limbs.  No dysmetria or dysdiadokinesia.  Gait narrow based and normal.    Labs:   CBC Full  -  ( 2019 06:36 )  WBC Count : 4.7 K/uL  Hemoglobin : 11.7 g/dL  Hematocrit : 34.9 %  Platelet Count - Automated : 247 K/uL  Mean Cell Volume : 88.6 fl  Mean Cell Hemoglobin : 29.8 pg  Mean Cell Hemoglobin Concentration : 33.6 gm/dL  Auto Neutrophil # : x  Auto Lymphocyte # : x  Auto Monocyte # : x  Auto Eosinophil # : x  Auto Basophil # : x  Auto Neutrophil % : x  Auto Lymphocyte % : x  Auto Monocyte % : x  Auto Eosinophil % : x  Auto Basophil % : x        136  |  104  |  6<L>  ----------------------------<  84  4.3   |  24  |  0.61    Ca    8.6      2019 10:03  Phos  3.5       Mg     2.1         TPro  6.1  /  Alb  3.0<L>  /  TBili  0.3  /  DBili  x   /  AST  14  /  ALT  17  /  AlkPhos  115      LIVER FUNCTIONS - ( 2019 10:03 )  Alb: 3.0 g/dL / Pro: 6.1 g/dL / ALK PHOS: 115 U/L / ALT: 17 U/L DA / AST: 14 U/L / GGT: x             Urinalysis Basic - ( 2019 16:59 )    Color: Yellow / Appearance: Clear / S.010 / pH: x  Gluc: x / Ketone: Negative  / Bili: Negative / Urobili: Negative   Blood: x / Protein: Negative / Nitrite: Negative   Leuk Esterase: Negative / RBC: x / WBC x   Sq Epi: x / Non Sq Epi: x / Bacteria: x          Neuroimaging:  NCHCT:   CT Angiography/Perfusion:  MRI Brain NC:  MRA Head/Neck:  EEG:    Assessment:  This is a 68y Female with h/o     Plan:   19 @ 12:30          Please contact the Neurology consult service with any questions.    Jax Woods MD  Neurology Attending  [facility] Neurology Consult    Patient is a 68y old  Female who presents with a chief complaint of near syncope, cough, SOB (2019 11:42)    HPI:  68 RHF who was discovered to have a cerebral aneurysm, location and size uncertain, 5 years ago at [facility] and initially managed by neurologist, Dr. Gagnon. Since then, the patient has followed with neurologist, Dr. Willam Zimmer, who has arranged annual MRI Brain scans. According to the patient, the most recent scan in summer 2018 showed a decreased aneurysm size to within 4 mm in diameter. In 2018, she had a fall resulting in a right ankle injury that required surgical repair with metal plates and screws placed. The patient presents with a 3-day history of shortness of breath, and has been discovered on V/Q scan to have pulmonary embolism. She also has tested positive with the influenza swab.    PAST MEDICAL & SURGICAL HISTORY:  THORNE (nonalcoholic steatohepatitis): stage 2  Chronic constipation  Hemorrhoids  Diverticulosis  Gastritis  Hernia  Hypothyroid  Ankle injury: right ankle  History of ankle surgery: bilateral ankle surgery, 1 month ago had RLE ankle surgery  H/O:   H/O hysterectomy with unilateral oophorectomy    FAMILY HISTORY:  Father: Emphysema, Lung cancer (with possible metastases to stomach and gallbladder)    Social History:  Quit smoking 25 years ago  Drinks alcohol on social occasions; never binge drinks  No illicit drug use    Allergies:  iodine (Angioedema)  penicillin (Rash)    MEDICATIONS  (STANDING):  aspirin enteric coated 81 milliGRAM(s) Oral daily  enoxaparin Injectable 90 milliGRAM(s) SubCutaneous every 12 hours  levothyroxine 75 MICROGram(s) Oral daily  oseltamivir 75 milliGRAM(s) Oral every 12 hours  pantoprazole    Tablet 40 milliGRAM(s) Oral before breakfast  senna 2 Tablet(s) Oral at bedtime  sodium chloride 0.9%. 1000 milliLiter(s) (75 mL/Hr) IV Continuous <Continuous>    MEDICATIONS  (PRN):  acetaminophen   Tablet .. 650 milliGRAM(s) Oral every 6 hours PRN Moderate Pain (4 - 6)  ondansetron   Solution 4 milliGRAM(s) Oral every 8 hours PRN Nausea    Review of systems:    Constitutional: No fever, weight loss or fatigue    Eyes: No eye pain or discharge  ENMT:  Sinus congestion present; No difficulty hearing  Neck: No pain or stiffness  Respiratory: Shortness of breath and cough present  Cardiovascular: Mild chest pain with shortness of breath  Gastrointestinal: No abdominal pain, nausea, vomiting or hematemesis; No diarrhea or constipation.   Genitourinary: No dysuria, frequency, hematuria or incontinence  Neurological: As per HPI  Skin: No rashes or lesions   Endocrine: No heat or cold intolerance; No hair loss  Musculoskeletal: No joint pain or swelling  Psychiatric: No depression, anxiety, mood swings  Heme/Lymph: No easy bruising or bleeding    Vital Signs Last 24 Hrs  T(C): 36.7 (2019 11:09), Max: 37.1 (2019 00:06)  T(F): 98.1 (2019 11:09), Max: 98.7 (2019 00:06)  HR: 59 (2019 11:09) (57 - 95)  BP: 112/80 (2019 11:09) (112/80 - 125/74)  RR: 19 (2019 11:09) (18 - 19)  SpO2: 99% (2019 11:09) (93% - 99%)    General Exam:  General: No acute distress  Respiratory: Diminished b/l breath sounds  Cardiovascular: Low rate, Regular rhythm No murmurs, Full b/l radial and pedal pulses    Neurological Exam:  General / Mental Status: Oriented to person, place, and time.  No dysarthria or aphasia present.  Naming and repetition intact.  Cranial Nerves: PERRLA, EOMI x 2, VFF x 4, No nystagmus or diplopia, B/l optic discs normal, without pallor.  B/l V1-V3 equal to light touch and pinprick.  Symmetric facial movement and palate elevation.  Hearing to finger rub diminished on right, intact on left.  5/5 strength with b/l sternocleidomastoid and trapezius.  Midline tongue protrusion with no atrophy or fasciculations.  Motor: Normal bulk and tone in all four extremities.  5/5 strength throughout all four extremities.  No downward drift, rigidity, spasticity, or tremors in any of the four extremities.  Sensation: Intact to light touch and pinprick in all four extremities.  Negative Romberg.  Reflexes: 2+ and symmetric at b/l biceps, triceps, brachioradialis, patellae, and ankles.  Toes flexor b/l.  Gait: Narrow-based and normal. No difficulty with tiptoe, heel, and tandem gaits.    NIHSS 0  MRS 0    Labs:   CBC Full  -  ( 2019 06:36 )  WBC Count : 4.7 K/uL  Hemoglobin : 11.7 g/dL  Hematocrit : 34.9 %  Platelet Count - Automated : 247 K/uL  Mean Cell Volume : 88.6 fl  Mean Cell Hemoglobin : 29.8 pg  Mean Cell Hemoglobin Concentration : 33.6 gm/dL        136  |  104  |  6<L>  ----------------------------<  84  4.3   |  24  |  0.61    Ca    8.6      2019 10:03  Phos  3.5       Mg     2.1         TPro  6.1  /  Alb  3.0<L>  /  TBili  0.3  /  DBili  x   /  AST  14  /  ALT  17  /  AlkPhos  115      Urinalysis Basic - ( 2019 16:59 )    Color: Yellow / Appearance: Clear / S.010 / pH: x  Gluc: x / Ketone: Negative  / Bili: Negative / Urobili: Negative   Blood: x / Protein: Negative / Nitrite: Negative   Leuk Esterase: Negative / RBC: x / WBC x   Sq Epi: x / Non Sq Epi: x / Bacteria: x      Radiology:    Carotid ultrasound (19): No hemodynamically significant carotid stenosis    V/Q scan (19): Defects involving RLL, RUL, and possibly NIKKI    Assessment:  68 RHF with new pulmonary emboli after recent right ankle surgery, with concern over initiating anticoagulation given history of cerebral aneurysm.      Recommendations:  Neurological recommendations are limited because there are no current head images or previous outpatient scans or neurologist records above. Recommendations are based on available information from history provided by the patient, and current diagnoses.    1. Please acquire most recent neurology clinic note and MRI/MRA Brain result from the office of neurologist, Dr. Willam Zimmer, to provide documentation of the patient's cerebral aneurysm location and size.    2. No head imaging recommendations now: CT Head would be unlikely to reveal a small aneurysm, CT Angiogram Head in the presence of iodinated contrast allergy can be performed with IV steroid premedication but patient is refusing this, and MRI/MRA Head is unsafe so soon after metal hardware placement for right ankle surgery.    3. Based on available information, the benefit of starting anticoagulation to treat existing pulmonary emboli outweighs the risk of bleed from an aneurysm that is reportedly small in diameter and decreasing in size, with mostly normal neurological examination at present.    4. If there is an acute change in neurological or mental status, consider urgent CT Head to evaluate for new hemorrhage.    5. Questions from patient and her daughter answered. They are reassured that she may continue aspirin 81mg daily if indicated for non-neurological reasons, but to avoid full-dose aspirin or other NSAIDs while using anticoagulation. they were also advised that there is no significant drug interaction between anticoagulation and either levothyroxine or omeprazole.      Please contact the Neurology consult service with any questions.    Jax Woods MD  Neurology Attending  [facility]

## 2019-01-30 RX ORDER — SODIUM CHLORIDE 0.65 %
1 AEROSOL, SPRAY (ML) NASAL
Qty: 0 | Refills: 0 | Status: DISCONTINUED | OUTPATIENT
Start: 2019-01-30 | End: 2019-02-02

## 2019-01-30 RX ADMIN — PANTOPRAZOLE SODIUM 40 MILLIGRAM(S): 20 TABLET, DELAYED RELEASE ORAL at 06:07

## 2019-01-30 RX ADMIN — SENNA PLUS 2 TABLET(S): 8.6 TABLET ORAL at 22:08

## 2019-01-30 RX ADMIN — Medication 75 MILLIGRAM(S): at 06:07

## 2019-01-30 RX ADMIN — Medication 75 MICROGRAM(S): at 06:07

## 2019-01-30 RX ADMIN — ENOXAPARIN SODIUM 90 MILLIGRAM(S): 100 INJECTION SUBCUTANEOUS at 12:03

## 2019-01-30 RX ADMIN — Medication 1 SPRAY(S): at 22:08

## 2019-01-30 RX ADMIN — Medication 75 MILLIGRAM(S): at 18:05

## 2019-01-30 RX ADMIN — ENOXAPARIN SODIUM 90 MILLIGRAM(S): 100 INJECTION SUBCUTANEOUS at 23:10

## 2019-01-30 RX ADMIN — Medication 81 MILLIGRAM(S): at 12:02

## 2019-01-30 RX ADMIN — Medication 100 MILLIGRAM(S): at 20:02

## 2019-01-30 NOTE — PROGRESS NOTE ADULT - SUBJECTIVE AND OBJECTIVE BOX
Patient is a 68y old  Female who presents with a chief complaint of near syncope, cough, SOB (2019 14:28)    pt seen in ed tele [ x ], reg med floor [   ], bed [x ], chair at bedside [   ], a+o x3 [ x ], lethargic [  ],  nad [ x ]        Allergies    iodine (Angioedema)  penicillin (Rash)        Vitals    T(F): 98.6 (19 @ 12:12), Max: 99 (19 @ 00:02)  HR: 56 (19 @ 12:12) (56 - 79)  BP: 115/58 (19 @ 12:12) (103/53 - 128/56)  RR: 18 (19 @ 12:12) (16 - 18)  SpO2: 99% (19 @ 12:12) (97% - 100%)  Wt(kg): --  CAPILLARY BLOOD GLUCOSE      POCT Blood Glucose.: 105 mg/dL (2019 16:02)      Labs                          11.7   4.7   )-----------( 247      ( 2019 06:36 )             34.9           136  |  104  |  6<L>  ----------------------------<  84  4.3   |  24  |  0.61    Ca    8.6      2019 10:03  Phos  3.5       Mg     2.1         TPro  6.1  /  Alb  3.0<L>  /  TBili  0.3  /  DBili  x   /  AST  14  /  ALT  17  /  AlkPhos  115        CARDIAC MARKERS ( 2019 17:17 )  <0.015 ng/mL / x     / x     / x     / x            .Urine Clean Catch (Midstream)   @ 23:55   <10,000 CFU/ml Normal Urogenital shameka present  --  --      < from: Transthoracic Echocardiogram (19 @ 07:46) >  CONCLUSIONS:  1. Normal mitral valve.  2. Normal trileaflet aortic valve.  3. Aortic Root: 3.2 cm.  4. Normal left atrium.  LA volume index = 20 cc/m2.  5. Normal left ventricular internal dimensions and wall  thicknesses.  6. Normal Left Ventricular Systolic Function,  (EF = 55 to  60%)  7. Normal diastolic function.  8. Normal right atrium.  9. Normal right ventricular size and function.  10. RV systolic pressure is 20 mm Hg.  11. There is trace tricuspid regurgitation.  12. Normal pulmonic valve.  13. Normal pericardium with no pericardial effusion.    < end of copied text >          Radiology Results      Meds    MEDICATIONS  (STANDING):  aspirin enteric coated 81 milliGRAM(s) Oral daily  enoxaparin Injectable 90 milliGRAM(s) SubCutaneous every 12 hours  levothyroxine 75 MICROGram(s) Oral daily  oseltamivir 75 milliGRAM(s) Oral every 12 hours  pantoprazole    Tablet 40 milliGRAM(s) Oral before breakfast  senna 2 Tablet(s) Oral at bedtime  sodium chloride 0.65% Nasal 1 Spray(s) Both Nostrils two times a day  sodium chloride 0.9%. 1000 milliLiter(s) (75 mL/Hr) IV Continuous <Continuous>      MEDICATIONS  (PRN):  acetaminophen   Tablet .. 650 milliGRAM(s) Oral every 6 hours PRN Moderate Pain (4 - 6)  guaiFENesin    Syrup 100 milliGRAM(s) Oral every 6 hours PRN Cough  ondansetron   Solution 4 milliGRAM(s) Oral every 8 hours PRN Nausea      Physical Exam    Neuro :  no focal deficits  Respiratory: CTA B/L  CV: RRR, S1S2, no murmurs,   Abdominal: Soft, NT, ND +BS,  Extremities: No edema, + peripheral pulses, right leg short leg cast    ASSESSMENT    Dizziness and giddiness possibly 2nd to vestibulitis 2nd to viral infxn  flu a  pe   h/o THORNE (nonalcoholic steatohepatitis)  Chronic constipation  Hemorrhoids  Diverticulosis  Gastritis  Hernia  Hypothyroid  Ankle injury  History of ankle surgery  H/O:   H/O hysterectomy with unilateral oophorectomy  No significant past surgical history      PLAN      cardio f/u noted  echo with EF = 55 to 60% noted above  US carotid neg for hemodynamically significant stenosis noted above.   Please acquire most recent neurology clinic note and MRI/MRA Brain result from the office of neurologist, Dr. Willam Zimmer, to provide documentation of the patient's cerebral aneurysm location and size.  neuro f/u  No head imaging recommendations now:  Based on available information, the benefit of starting anticoagulation to treat existing pulmonary emboli outweighs the risk of bleed from an aneurysm that is reportedly small in diameter and decreasing in size  If there is an acute change in neurological or mental status, consider urgent CT Head to evaluate for new hemorrhage  cont tariq flu  add fluticasone nasal spray bid  pulm cons noted  VQ Scan shows high probability for PE noted above  cont lovenox  hem/onc eval.  phys tx  cont current meds

## 2019-01-30 NOTE — CONSULT NOTE ADULT - PROBLEM SELECTOR RECOMMENDATION 2
tamiflu for now x 5 days, droplet precautions  anticoagulation  VQ scan showed high probability for PE  echocardiogram  telemetry    cardiology follow up + for Influenza A.   Droplet Precautions  Tamiflu x 5 days.  O2 Supplements.

## 2019-01-30 NOTE — CONSULT NOTE ADULT - PROBLEM SELECTOR RECOMMENDATION 7
elevated liver enzymes  f/u CMP in AM  dash diet  tylenol PRN only if liver enzymes wnl or trending down. patient is anticoagulated.

## 2019-01-30 NOTE — CONSULT NOTE ADULT - PROBLEM SELECTOR RECOMMENDATION 9
anticoagulation  hem/onc eval.  Chest CT scan VQ Scan shows high probability for PE.   US carotid noted.   Echo noted.   Anticoagulation  hem/onc eval.  CT Chest.  Tele monitor.  O2 Supplement.

## 2019-01-30 NOTE — CONSULT NOTE ADULT - NSPROBSELRECBLANK_8_GEN
45 y/o F with h/o asthma, bipolar, htn p/w diffuse abd cramping since this morning and had one episode of maroon colored stool while at work, no nausea, vomiting but feels very tired and weak. LMP last week and denies pregnancy. No recent travel or sick contact. No fever, chills, diarrhea, fall or instrumentation of rectum reported
DISPLAY PLAN FREE TEXT
DISPLAY PLAN FREE TEXT

## 2019-01-30 NOTE — CONSULT NOTE ADULT - PROBLEM SELECTOR RECOMMENDATION 3
RLE elevation while in bed, NWB  f/u physical therapy consult. Elevated liver enzymes  f/u CMP in AM  tylenol PRN only if liver enzymes  WNL or trending down.

## 2019-01-30 NOTE — CONSULT NOTE ADULT - SUBJECTIVE AND OBJECTIVE BOX
PULMONARY CONSULT NOTE      CURT CRAWFORD  [id number]    Patient is a 68y old  Female who presents with a chief complaint of near syncope, cough, SOB (2019 13:22)     History of Present Illness:  Reason for Admission: near syncope, cough, SOB	  History of Present Illness: 	  69 y/o F with a significant PMHx of R ankle injury and no significant PSHx presents to the ED with complaints of dizziness for 3 days accompanied by SOB, cough, nasal congestion, urinary frequency, low urinary output, nausea, vomiting, and diaphoresis. Pt states he feels like he's going to pass out, with a warm sensation traveling from legs to her face. Pt's daughter notes pt had SOB x a few days ago. Pt's family says they suspected flu because pt had cough and nasal congestion, with associated urinary frequency and low urinary output despite adequate PO intake. Pt was diagnosed with a UTI prior to ankle injury, however was never treated. Pt reports she also has had nausea, vomiting, and diaphoresis. Pt denies vertiginous sxs, chest pain, or any other complaints.     HISTORY OF PRESENT ILLNESS: As above ,alert , lying in bed. With cough and sob.     MEDICATIONS  (STANDING):  aspirin enteric coated 81 milliGRAM(s) Oral daily  enoxaparin Injectable 90 milliGRAM(s) SubCutaneous every 12 hours  levothyroxine 75 MICROGram(s) Oral daily  oseltamivir 75 milliGRAM(s) Oral every 12 hours  pantoprazole    Tablet 40 milliGRAM(s) Oral before breakfast  senna 2 Tablet(s) Oral at bedtime  sodium chloride 0.9%. 1000 milliLiter(s) (75 mL/Hr) IV Continuous <Continuous>      MEDICATIONS  (PRN):  acetaminophen   Tablet .. 650 milliGRAM(s) Oral every 6 hours PRN Moderate Pain (4 - 6)  guaiFENesin    Syrup 100 milliGRAM(s) Oral every 6 hours PRN Cough  ondansetron   Solution 4 milliGRAM(s) Oral every 8 hours PRN Nausea      Allergies    iodine (Angioedema)  penicillin (Rash)    Intolerances        PAST MEDICAL & SURGICAL HISTORY:  THORNE (nonalcoholic steatohepatitis): stage 2  Chronic constipation  Hemorrhoids  Diverticulosis  Gastritis  Hernia  Hypothyroid  Ankle injury: right ankle  History of ankle surgery: bilateral ankle surgery, 1 month ago had RLE ankle surgery  H/O:   H/O hysterectomy with unilateral oophorectomy      FAMILY HISTORY:  Family history unknown      SOCIAL HISTORY  Smoking History:     REVIEW OF SYSTEMS:    CONSTITUTIONAL:  No fevers, chills, sweats    HEENT:  Eyes:  No diplopia or blurred vision. ENT:  No earache, sore throat or runny nose.    CARDIOVASCULAR:  No pressure, squeezing, tightness, or heaviness about the chest; no palpitations.    RESPIRATORY:  Per HPI    GASTROINTESTINAL:  No abdominal pain, nausea, vomiting or diarrhea.    GENITOURINARY:  No dysuria, frequency or urgency.    NEUROLOGIC:  No paresthesias, fasciculations, seizures or weakness.    PSYCHIATRIC:  No disorder of thought or mood.    Vital Signs Last 24 Hrs  T(C): 37 (2019 12:12), Max: 37.2 (2019 00:02)  T(F): 98.6 (2019 12:12), Max: 99 (2019 00:02)  HR: 56 (2019 12:12) (56 - 79)  BP: 115/58 (2019 12:12) (103/53 - 128/56)  BP(mean): --  RR: 18 (2019 12:12) (16 - 18)  SpO2: 99% (2019 12:12) (97% - 100%)  I&O's Detail      PHYSICAL EXAMINATION:    GENERAL: The patient is a well-developed, well-nourished _____in no apparent distress.     HEENT: Head is normocephalic and atraumatic. Extraocular muscles are intact. Mucous membranes are moist.     NECK: Supple.     LUNGS: Clear to auscultation without wheezing, rales, or rhonchi. Respirations unlabored    HEART: Regular rate and rhythm without murmur.    ABDOMEN: Soft, nontender, and nondistended.  No hepatosplenomegaly is noted.    EXTREMITIES: Without any cyanosis, clubbing, rash, lesions or edema.    NEUROLOGIC: Grossly intact.      LABS:                        11.7   4.7   )-----------( 247      ( 2019 06:36 )             34.9         136  |  104  |  6<L>  ----------------------------<  84  4.3   |  24  |  0.61    Ca    8.6      2019 10:03  Phos  3.5       Mg     2.1         TPro  6.1  /  Alb  3.0<L>  /  TBili  0.3  /  DBili  x   /  AST  14  /  ALT  17  /  AlkPhos  115        Urinalysis Basic - ( 2019 16:59 )    Color: Yellow / Appearance: Clear / S.010 / pH: x  Gluc: x / Ketone: Negative  / Bili: Negative / Urobili: Negative   Blood: x / Protein: Negative / Nitrite: Negative   Leuk Esterase: Negative / RBC: x / WBC x   Sq Epi: x / Non Sq Epi: x / Bacteria: x        CARDIAC MARKERS ( 2019 17:17 )  <0.015 ng/mL / x     / x     / x     / x                    MICROBIOLOGY:    RADIOLOGY & ADDITIONAL STUDIES:    CXR:  < from: Xray Chest 1 View AP/PA (19 @ 18:11) >  Impression:    No acute pulmonary process demonstrated.    < end of copied text >    Ct scan chest:    ekg;    echo: PULMONARY CONSULT NOTE      CURT CRAWFORD  MRN-[id number]    Patient is a 68y old  Female who presents with a chief complaint of near syncope, cough, SOB (2019 13:22)     History of Present Illness:  Reason for Admission: near syncope, cough, SOB	  History of Present Illness: 	  67 y/o F with a significant PMHx of R ankle injury and no significant PSHx presents to the ED with complaints of dizziness for 3 days accompanied by SOB, cough, nasal congestion, urinary frequency, low urinary output, nausea, vomiting, and diaphoresis. Pt states he feels like he's going to pass out, with a warm sensation traveling from legs to her face. Pt's daughter notes pt had SOB x a few days ago. Pt's family says they suspected flu because pt had cough and nasal congestion, with associated urinary frequency and low urinary output despite adequate PO intake. Pt was diagnosed with a UTI prior to ankle injury, however was never treated. Pt reports she also has had nausea, vomiting, and diaphoresis. Pt denies vertiginous sxs, chest pain, or any other complaints.     HISTORY OF PRESENT ILLNESS:   As above ,alert , lying in bed in NAD. Pt c/o cough with clear/white mucus. C/o mild SOB today.     MEDICATIONS  (STANDING):  aspirin enteric coated 81 milliGRAM(s) Oral daily  enoxaparin Injectable 90 milliGRAM(s) SubCutaneous every 12 hours  levothyroxine 75 MICROGram(s) Oral daily  oseltamivir 75 milliGRAM(s) Oral every 12 hours  pantoprazole    Tablet 40 milliGRAM(s) Oral before breakfast  senna 2 Tablet(s) Oral at bedtime  sodium chloride 0.9%. 1000 milliLiter(s) (75 mL/Hr) IV Continuous <Continuous>      MEDICATIONS  (PRN):  acetaminophen   Tablet .. 650 milliGRAM(s) Oral every 6 hours PRN Moderate Pain (4 - 6)  guaiFENesin    Syrup 100 milliGRAM(s) Oral every 6 hours PRN Cough  ondansetron   Solution 4 milliGRAM(s) Oral every 8 hours PRN Nausea      Allergies    iodine (Angioedema)  penicillin (Rash)    Intolerances        PAST MEDICAL & SURGICAL HISTORY:  THORNE (nonalcoholic steatohepatitis): stage 2  Chronic constipation  Hemorrhoids  Diverticulosis  Gastritis  Hernia  Hypothyroid  Ankle injury: right ankle  History of ankle surgery: bilateral ankle surgery, 1 month ago had RLE ankle surgery  H/O:   H/O hysterectomy with unilateral oophorectomy      FAMILY HISTORY:  Family history unknown      SOCIAL HISTORY  Smoking History:     REVIEW OF SYSTEMS:    CONSTITUTIONAL:  No fevers, chills, sweats    HEENT:  Eyes:  No diplopia or blurred vision. ENT:  No earache, sore throat or runny nose.    CARDIOVASCULAR:  No pressure, squeezing, tightness, or heaviness about the chest; no palpitations.    RESPIRATORY:  Per HPI    GASTROINTESTINAL:  No abdominal pain, nausea, vomiting or diarrhea.    GENITOURINARY:  No dysuria, frequency or urgency.    NEUROLOGIC:  No paresthesias, fasciculations, seizures or weakness.    PSYCHIATRIC:  No disorder of thought or mood.    Vital Signs Last 24 Hrs  T(C): 37 (2019 12:12), Max: 37.2 (2019 00:02)  T(F): 98.6 (2019 12:12), Max: 99 (2019 00:02)  HR: 56 (2019 12:12) (56 - 79)  BP: 115/58 (2019 12:12) (103/53 - 128/56)  BP(mean): --  RR: 18 (2019 12:12) (16 - 18)  SpO2: 99% (2019 12:12) (97% - 100%)  I&O's Detail      PHYSICAL EXAMINATION:    GENERAL: The patient is a well-developed, well-nourished in no apparent distress.     HEENT: Head is normocephalic and atraumatic. Extraocular muscles are intact. Mucous membranes are moist.     NECK: Supple.     LUNGS: Clear to auscultation without wheezing, rales, or rhonchi. Respirations unlabored    HEART: Regular rate and rhythm without murmur.    ABDOMEN: Soft, nontender, and nondistended.  No hepatosplenomegaly is noted.    EXTREMITIES: Without any cyanosis, clubbing, rash, lesions or edema. Right ankle/lower ext immobilizer.     NEUROLOGIC: Grossly intact.      LABS:                        11.7   4.7   )-----------( 247      ( 2019 06:36 )             34.9         136  |  104  |  6<L>  ----------------------------<  84  4.3   |  24  |  0.61    Ca    8.6      2019 10:03  Phos  3.5       Mg     2.1         TPro  6.1  /  Alb  3.0<L>  /  TBili  0.3  /  DBili  x   /  AST  14  /  ALT  17  /  AlkPhos  115        Urinalysis Basic - ( 2019 16:59 )    Color: Yellow / Appearance: Clear / S.010 / pH: x  Gluc: x / Ketone: Negative  / Bili: Negative / Urobili: Negative   Blood: x / Protein: Negative / Nitrite: Negative   Leuk Esterase: Negative / RBC: x / WBC x   Sq Epi: x / Non Sq Epi: x / Bacteria: x        CARDIAC MARKERS ( 2019 17:17 )  <0.015 ng/mL / x     / x     / x     / x          FLU A B RSV Detection by PCR (19 @ 21:49)    Flu A Result: Detected: The Flu A B RSV assay is a Real-Time PCR test for the qualitative  detection and differentiation of Influenza A, Influenza B, and  Respiratory Syncytial Virus on nasopharyngeal swabs. The results should  be interpreted in the context of all clinical and laboratory findings.    Flu B Result: [facility]    RSV Result: [facility]        MICROBIOLOGY:    RADIOLOGY & ADDITIONAL STUDIES:    CXR:  < from: Xray Chest 1 View AP/PA (19 @ 18:11) >  Impression:    No acute pulmonary process demonstrated.    < end of copied text >      < from: NM Pulmonary Ventilation/Perfusion Scan (19 @ 08:55) >  IMPRESSION: Abnormal ventilation/perfusion lung scan. High probability of   pulmonary embolus.    < end of copied text >    < from: US Duplex Carotid Arteries Complete, Bilateral (19 @ 10:52) >  IMPRESSION:  All peak systolic and end diastolic velocities are within   normal limits. No sonographic evidence of hemodynamically significant   stenosis.    < end of copied text >    < from: Transthoracic Echocardiogram (19 @ 07:46) >  CONCLUSIONS:  1. Normal mitral valve.  2. Normal trileaflet aortic valve.  3. Aortic Root: 3.2 cm.  4. Normal left atrium.  LA volume index = 20 cc/m2.  5. Normal left ventricular internal dimensions and wall  thicknesses.  6. Normal Left Ventricular Systolic Function,  (EF = 55 to  60%)  7. Normal diastolic function.  8. Normal right atrium.  9. Normal right ventricular size and function.  10. RV systolic pressure is 20 mm Hg.  11. There is trace tricuspid regurgitation.  12. Normal pulmonic valve.  13. Normal pericardium with no pericardial effusion.    < end of copied text >

## 2019-01-30 NOTE — ED ADULT NURSE REASSESSMENT NOTE - NS ED NURSE REASSESS COMMENT FT1
Patient remains hemodynamically stable and in no acute distress, speaking in full sentences and breathing comfortably in room air. Assisted to bedpan and commode throughout the night. Patient verbalizes no medical complaints.

## 2019-01-30 NOTE — PROGRESS NOTE ADULT - SUBJECTIVE AND OBJECTIVE BOX
CHIEF COMPLAINT:Patient is a 68y old  Female who presents with a chief complaint of near syncope, cough, SOB.Pt appears comfortable.    	  REVIEW OF SYSTEMS:  CONSTITUTIONAL: No fever, weight loss, or fatigue  EYES: No eye pain, visual disturbances, or discharge  ENT:  No difficulty hearing, tinnitus, vertigo; No sinus or throat pain  NECK: No pain or stiffness  RESPIRATORY: No cough, wheezing, chills or hemoptysis; No Shortness of Breath  CARDIOVASCULAR: No chest pain, palpitations, passing out, dizziness, or leg swelling  GASTROINTESTINAL: No abdominal or epigastric pain. No nausea, vomiting, or hematemesis; No diarrhea or constipation. No melena or hematochezia.  GENITOURINARY: No dysuria, frequency, hematuria, or incontinence  NEUROLOGICAL: No headaches, memory loss, loss of strength, numbness, or tremors  SKIN: No itching, burning, rashes, or lesions   LYMPH Nodes: No enlarged glands  ENDOCRINE: No heat or cold intolerance; No hair loss  MUSCULOSKELETAL: No joint pain or swelling; No muscle, back, or extremity pain  PSYCHIATRIC: No depression, anxiety, mood swings, or difficulty sleeping  HEME/LYMPH: No easy bruising, or bleeding gums  ALLERGY AND IMMUNOLOGIC: No hives or eczema	      PHYSICAL EXAM:  T(C): 37 (01-30-19 @ 12:12), Max: 37.2 (01-30-19 @ 00:02)  HR: 56 (01-30-19 @ 12:12) (56 - 79)  BP: 115/58 (01-30-19 @ 12:12) (103/53 - 128/56)  RR: 18 (01-30-19 @ 12:12) (16 - 18)  SpO2: 99% (01-30-19 @ 12:12) (97% - 100%)    Appearance: Normal	  HEENT:   Normal oral mucosa, PERRL, EOMI	  Lymphatic: No lymphadenopathy  Cardiovascular: Normal S1 S2, No JVD, No murmurs, No edema  Respiratory: Lungs clear to auscultation	  Psychiatry: A & O x 3, Mood & affect appropriate  Gastrointestinal:  Soft, Non-tender, + BS	  Skin: No rashes, No ecchymoses, No cyanosis	  Neurologic: Non-focal  Extremities: Normal range of motion, No clubbing, cyanosis or edema  Vascular: Peripheral pulses palpable 2+ bilaterally    MEDICATIONS  (STANDING):  aspirin enteric coated 81 milliGRAM(s) Oral daily  enoxaparin Injectable 90 milliGRAM(s) SubCutaneous every 12 hours  levothyroxine 75 MICROGram(s) Oral daily  oseltamivir 75 milliGRAM(s) Oral every 12 hours  pantoprazole    Tablet 40 milliGRAM(s) Oral before breakfast  senna 2 Tablet(s) Oral at bedtime  sodium chloride 0.9%. 1000 milliLiter(s) (75 mL/Hr) IV Continuous <Continuous>      LABS:	 	    CARDIAC MARKERS:  CARDIAC MARKERS ( 28 Jan 2019 17:17 )  <0.015 ng/mL / x     / x     / x     / x                                    11.7   4.7   )-----------( 247      ( 29 Jan 2019 06:36 )             34.9     01-29    136  |  104  |  6<L>  ----------------------------<  84  4.3   |  24  |  0.61    Ca    8.6      29 Jan 2019 10:03  Phos  3.5     01-29  Mg     2.1     01-29    TPro  6.1  /  Alb  3.0<L>  /  TBili  0.3  /  DBili  x   /  AST  14  /  ALT  17  /  AlkPhos  115  01-29      Lipid Profile: Cholesterol 130  LDL 71  HDL 53  TG 31    HgA1c: Hemoglobin A1C, Whole Blood: 5.5 % (01-29 @ 09:13)    TSH: Thyroid Stimulating Hormone, Serum: 2.01 uU/mL (01-29 @ 06:36)  Thyroid Stimulating Hormone, Serum: 2.36 uU/mL (01-28 @ 20:37)      	  OBSERVATIONS:  Mitral Valve: Normal mitral valve.  Aortic Root: Aortic Root: 3.2 cm.    Aortic Valve: Normal trileaflet aortic valve.  Left Atrium: Normal left atrium.  LA volume index = 20  cc/m2.  Left Ventricle:Normal Left Ventricular Systolic Function,  (EF = 55 to 60%) Normal left ventricular internal  dimensions and wall thicknesses. Normal diastolic function.  Right Heart: Normal right atrium. Normal right ventricular  size and function. There is trace tricuspid regurgitation.  Normal pulmonic valve.  Pericardium/PleuraNormal pericardium with no pericardial  effusion.  Hemodynamic: RV systolic pressure is 20 mm Hg.

## 2019-01-31 DIAGNOSIS — Z29.9 ENCOUNTER FOR PROPHYLACTIC MEASURES, UNSPECIFIED: ICD-10-CM

## 2019-01-31 DIAGNOSIS — I67.1 CEREBRAL ANEURYSM, NONRUPTURED: ICD-10-CM

## 2019-01-31 RX ORDER — FLUTICASONE PROPIONATE 50 MCG
1 SPRAY, SUSPENSION NASAL
Qty: 0 | Refills: 0 | Status: DISCONTINUED | OUTPATIENT
Start: 2019-01-31 | End: 2019-02-02

## 2019-01-31 RX ADMIN — Medication 100 MILLIGRAM(S): at 13:05

## 2019-01-31 RX ADMIN — Medication 100 MILLIGRAM(S): at 22:13

## 2019-01-31 RX ADMIN — Medication 81 MILLIGRAM(S): at 13:05

## 2019-01-31 RX ADMIN — Medication 1 SPRAY(S): at 18:43

## 2019-01-31 RX ADMIN — Medication 75 MILLIGRAM(S): at 05:37

## 2019-01-31 RX ADMIN — Medication 75 MICROGRAM(S): at 05:37

## 2019-01-31 RX ADMIN — ONDANSETRON 4 MILLIGRAM(S): 8 TABLET, FILM COATED ORAL at 13:05

## 2019-01-31 RX ADMIN — Medication 1 SPRAY(S): at 13:06

## 2019-01-31 RX ADMIN — Medication 1 SPRAY(S): at 05:37

## 2019-01-31 RX ADMIN — Medication 100 MILLIGRAM(S): at 18:43

## 2019-01-31 RX ADMIN — Medication 75 MILLIGRAM(S): at 18:42

## 2019-01-31 RX ADMIN — ENOXAPARIN SODIUM 90 MILLIGRAM(S): 100 INJECTION SUBCUTANEOUS at 13:08

## 2019-01-31 RX ADMIN — PANTOPRAZOLE SODIUM 40 MILLIGRAM(S): 20 TABLET, DELAYED RELEASE ORAL at 05:37

## 2019-01-31 NOTE — DIETITIAN INITIAL EVALUATION ADULT. - OTHER INFO
Pt visited. Pt  seen for nutritin consult. Pt is on Droplet isolation. Observed Lunch meal pt ate ~25 % of meal. C/O nausea. Pt with poor po  intake x ~ 8 days. prior to that  pt was eating good. NKFA. food  choices updated

## 2019-01-31 NOTE — CONSULT NOTE ADULT - SUBJECTIVE AND OBJECTIVE BOX
Patient is a 68y old  Female who presents with a chief complaint of near syncope, cough, SOB (2019 15:37)      HPI:  67 y/o F with a significant PMHx of R ankle injury and no significant PSHx presents to the ED with complaints of dizziness for 3 days accompanied by SOB, cough, nasal congestion, urinary frequency, low urinary output, nausea, vomiting, and diaphoresis. Pt states he feels like he's going to pass out, with a warm sensation traveling from legs to her face. Pt's daughter notes pt had SOB x a few days ago. Pt's family says they suspected flu because pt had cough and nasal congestion, with associated urinary frequency and low urinary output despite adequate PO intake. Pt was diagnosed with a UTI prior to ankle injury, however was never treated. Pt reports she also has had nausea, vomiting, and diaphoresis. Pt denies vertiginous sxs, chest pain, or any other complaints. (2019 21:22)  She had surgery for right ankle on 18 and no more DVT prophylaxis since .  FluA was positive.       ROS:  Negative except for:    PAST MEDICAL & SURGICAL HISTORY:  THORNE (nonalcoholic steatohepatitis): stage 2  Chronic constipation  Hemorrhoids  Diverticulosis  Gastritis  Hernia  Hypothyroid  Ankle injury: right ankle  History of ankle surgery: bilateral ankle surgery, 1 month ago had RLE ankle surgery  H/O:   H/O hysterectomy with unilateral oophorectomy      SOCIAL HISTORY:    FAMILY HISTORY:  Family history unknown      MEDICATIONS  (STANDING):  aspirin enteric coated 81 milliGRAM(s) Oral daily  enoxaparin Injectable 90 milliGRAM(s) SubCutaneous every 12 hours  guaiFENesin    Syrup 100 milliGRAM(s) Oral every 6 hours  levothyroxine 75 MICROGram(s) Oral daily  oseltamivir 75 milliGRAM(s) Oral every 12 hours  pantoprazole    Tablet 40 milliGRAM(s) Oral before breakfast  senna 2 Tablet(s) Oral at bedtime  sodium chloride 0.65% Nasal 1 Spray(s) Both Nostrils two times a day  sodium chloride 0.9%. 1000 milliLiter(s) (75 mL/Hr) IV Continuous <Continuous>    MEDICATIONS  (PRN):  acetaminophen   Tablet .. 650 milliGRAM(s) Oral every 6 hours PRN Moderate Pain (4 - 6)  ondansetron   Solution 4 milliGRAM(s) Oral every 8 hours PRN Nausea      Allergies    iodine (Angioedema)  penicillin (Rash)    Intolerances        Vital Signs Last 24 Hrs  T(C): 37 (2019 07:46), Max: 37.1 (2019 20:41)  T(F): 98.6 (2019 07:46), Max: 98.8 (2019 20:41)  HR: 80 (2019 07:46) (56 - 80)  BP: 120/65 (2019 07:46) (115/58 - 141/65)  BP(mean): --  RR: 18 (2019 07:46) (16 - 18)  SpO2: 95% (2019 07:46) (95% - 99%)    PHYSICAL EXAM  General: adult in NAD  HEENT: clear oropharynx, anicteric sclera, pink conjunctiva  Neck: supple  CV: normal S1/S2 with no murmur rubs or gallops  Lungs: positive air movement b/l ant lungs,clear to auscultation, no wheezes, no rales  Abdomen: soft non-tender non-distended, no hepatosplenomegaly  Ext: no clubbing cyanosis or edema  Skin: no rashes and no petechiae  Neuro: alert and oriented X 4, no focal deficits      LABS:            Serial CBC's   @ 06:36  Hct-34.9 / Hgb-11.7 / Plat-247 / RBC-3.94 / WBC-4.7  Serial CBC's   @ 17:17  Hct-41.4 / Hgb-13.9 / Plat-288 / RBC-4.62 / WBC-5.7        < from: NM Pulmonary Ventilation/Perfusion Scan (19 @ 08:55) >  INTERPRETATION:  CLINICAL INFORMATION: 68 year-old female with shortness   of breath and dizziness, status post ankle surgery, referred to evaluate   for pulmonary embolism.    RADIOPHARMACEUTICAL: 1 mCi Tc-99m-DTPA by inhalation; 6.2 mCi Tc-99m-MAA,   I.V.    TECHNIQUE:  Ventilation and perfusion images of the lungs were obtained   following administration of Tc-99m-DTPA and Tc-99m-MAA. Imageswere   obtained in the anterior, posterior, both lateral, and all 4 oblique   projections. This study was interpreted in conjunction with a chest   radiograph of 2019.    COMPARISON: No previous lung V/Q scan for comparison.    FINDINGS: There are segmental mismatched ventilation/perfusion defects   involving the lateral basal segment of the right lower lobe, anterior   segment of the right upper lobe and possibly in the apical posterior   segment of the left upper lobe. There is heterogeneous radiotracer   distribution in the lungs on both the ventilation and the perfusion   images.       IMPRESSION: Abnormal ventilation/perfusion lung scan. High probability of   pulmonary embolus.      MAR Castro  was notified of results by Dr. Walker via telephone on   2019 at approximately 9:25 AM.    < end of copied text >  < from: US Duplex Venous Lower Ext Ltd, Right (19 @ 20:51) >  INTERPRETATION:  CLINICAL STATEMENT: Swelling leg.    TECHNIQUE: Ultrasound of right lower extremity deep venous system.    COMPARISON: None.    FINDINGS:  There is color and spectral flow, compression and augmentation of the   common femoral, superficial femoral and popliteal veins.    There is flow in the posterior tibial vein.    IMPRESSION:  No evidence of DVT.      < end of copied text >            Vitamin B12, Serum: 1103 pg/mL ( @ 09:13)              BLOOD SMEAR INTERPRETATION:       RADIOLOGY & ADDITIONAL STUDIES:

## 2019-01-31 NOTE — CONSULT NOTE ADULT - ASSESSMENT
67 y/o F with a significant PMHx of R ankle injury and no significant PSHx presents to the ED with complaints of dizziness for 3 days accompanied by SOB, cough, nasal congestion, urinary frequency, low urinary output, nausea, vomiting, and diaphoresis, PE and Influenza A.  1.Echocardiogram.  2.ABX.  3.PE-full dose lovenox.  4.Hx of CNS aneurysm-Neurology eval called.  5.Hypothyroidism-synthroid.  6.PPI.
68 year old lady who had surgery for right ankle in Dec 2018 and has been in cast.  She moves very little.  SOB, weakness and near syncope developed with cough.  Her Flu A was positive and V/Q scan was positive to PE.
Patient admitted for shortness of breath significant for possible URI vs pulmonary embolism given recent immobilization after ankle surgery. Patient has iodine contrast allergy, therefore will pursue V/Q scan.
Patient admitted for shortness of breath significant for possible URI vs pulmonary embolism given recent immobilization after ankle surgery. Patient has iodine contrast allergy, therefore will pursue V/Q scan.

## 2019-01-31 NOTE — DIETITIAN INITIAL EVALUATION ADULT. - PROBLEM SELECTOR PLAN 7
IMPROVE VTE Individual Risk Assessment          RISK                                                          Points  [  ] Previous VTE                                                3  [  ] Thrombophilia                                             2  [ x ] Lower limb paralysis                                   2        (unable to hold up >15 seconds)    [  ] Current Cancer                                             2         (within 6 months)  [ x ] Immobilization > 24 hrs                              1  [  ] ICU/CCU stay > 24 hours                             1  [ x ] Age > 60                                                         1    IMPROVE VTE Score: 4    c/w heparin subQ for vte prophylaxis

## 2019-01-31 NOTE — CONSULT NOTE ADULT - REASON FOR ADMISSION
near syncope, cough, SOB

## 2019-01-31 NOTE — DIETITIAN INITIAL EVALUATION ADULT. - PROBLEM SELECTOR PLAN 1
concern for DVT/PE given recent ankle surgery on RLE, near syncope, intermittent shortness of breath, however may also be vasovagal as patient is also positive for influenza A with active URI symptoms  -c/w tamiflu for now x 5 days, droplet precautions  -VQ scan to r/o PE given severe iodine contrast allergy, unable to obtain CTA chest  patient does not want to do prophylactic full dose anticoagulation given history of severe gastritis, therefore will wait until V/Q results - patient understands risks of foregoing full anticoagulation  -f/u VQ scan in AM  -f/u echocardiogram, bilateral carotid dopplers, for near syncope event  -c/w telemetry for now  -f/u cardiology - Dr. Villalobos

## 2019-01-31 NOTE — PROGRESS NOTE ADULT - SUBJECTIVE AND OBJECTIVE BOX
Patient is a 68y old  Female who presents with a chief complaint of near syncope, cough, SOB (2019 10:07)    pt seen in ed tele [  ], reg med floor [  x ], bed [x ], chair at bedside [   ], a+o x3 [ x ], lethargic [  ],  nad [ x ]    c/o persistent cough    Allergies    iodine (Angioedema)  penicillin (Rash)        Vitals    T(F): 98.6 (19 @ 07:46), Max: 98.8 (19 @ 20:41)  HR: 80 (19 @ 07:46) (56 - 80)  BP: 120/65 (19 @ 07:46) (115/58 - 141/65)  RR: 18 (19 @ 07:46) (16 - 18)  SpO2: 95% (19 @ 07:46) (95% - 99%)  Wt(kg): --  CAPILLARY BLOOD GLUCOSE          Labs                      .Urine Clean Catch (Midstream)   @ 23:55   <10,000 CFU/ml Normal Urogenital shameka present  --  --          Radiology Results      Meds    MEDICATIONS  (STANDING):  aspirin enteric coated 81 milliGRAM(s) Oral daily  benzonatate 100 milliGRAM(s) Oral three times a day  enoxaparin Injectable 90 milliGRAM(s) SubCutaneous every 12 hours  guaiFENesin    Syrup 100 milliGRAM(s) Oral every 6 hours  levothyroxine 75 MICROGram(s) Oral daily  oseltamivir 75 milliGRAM(s) Oral every 12 hours  pantoprazole    Tablet 40 milliGRAM(s) Oral before breakfast  senna 2 Tablet(s) Oral at bedtime  sodium chloride 0.65% Nasal 1 Spray(s) Both Nostrils two times a day  sodium chloride 0.9%. 1000 milliLiter(s) (75 mL/Hr) IV Continuous <Continuous>      MEDICATIONS  (PRN):  acetaminophen   Tablet .. 650 milliGRAM(s) Oral every 6 hours PRN Moderate Pain (4 - 6)  ondansetron   Solution 4 milliGRAM(s) Oral every 8 hours PRN Nausea      Physical Exam        Neuro :  no focal deficits  Respiratory: CTA B/L  CV: RRR, S1S2, no murmurs,   Abdominal: Soft, NT, ND +BS,  Extremities: No edema, + peripheral pulses, right leg short leg cast    ASSESSMENT    Dizziness and giddiness possibly 2nd to vestibulitis 2nd to viral infxn  flu a  pe   h/o THORNE (nonalcoholic steatohepatitis)  Chronic constipation  Hemorrhoids  Diverticulosis  Gastritis  Hernia  Hypothyroid  Ankle injury  History of ankle surgery  H/O:   H/O hysterectomy with unilateral oophorectomy  No significant past surgical history      PLAN      cardio f/u noted  echo with EF = 55 to 60% noted above  US carotid neg for hemodynamically significant stenosis noted above.   Please acquire most recent neurology clinic note and MRI/MRA Brain result from the office of neurologist, Dr. Willam Zimmer, to provide documentation of the patient's cerebral aneurysm location and size.  neuro f/u  No head imaging recommendations now:  Based on available information, the benefit of starting anticoagulation to treat existing pulmonary emboli outweighs the risk of bleed from an aneurysm that is reportedly small in diameter and decreasing in size  If there is an acute change in neurological or mental status, consider urgent CT Head to evaluate for new hemorrhage  cont tariq flu  add fluticasone nasal spray bid  add tessalon perles tid for cough  pulm cons noted  VQ Scan shows high probability for PE noted above  cont lovenox  hem/onc eval noted  The small aneurysm should not post much risk for bleeding if she takes AC for 3 months.    There is no clear indication for ASA so  Will stop it.    Risk of AC is discussed with her and her daughter.  We can switch AC to eliquis or xarelto whichever is covered by pt's insurance.    she needs it for 3 months.  phys tx eval noted and rec home w/ home PT  cont current meds  d/c plan for am if stable

## 2019-01-31 NOTE — PROGRESS NOTE ADULT - SUBJECTIVE AND OBJECTIVE BOX
Resident Note discussed with  primary attending    Patient is a 68y old  Female who presents with a chief complaint of near syncope, cough, SOB (30 Jan 2019 15:37)      INTERVAL HPI/ OVERNIGHT EVENTS: no new complaints, still has sough and slight nausea.     MEDICATIONS  (STANDING):  aspirin enteric coated 81 milliGRAM(s) Oral daily  enoxaparin Injectable 90 milliGRAM(s) SubCutaneous every 12 hours  guaiFENesin    Syrup 100 milliGRAM(s) Oral every 6 hours  levothyroxine 75 MICROGram(s) Oral daily  oseltamivir 75 milliGRAM(s) Oral every 12 hours  pantoprazole    Tablet 40 milliGRAM(s) Oral before breakfast  senna 2 Tablet(s) Oral at bedtime  sodium chloride 0.65% Nasal 1 Spray(s) Both Nostrils two times a day  sodium chloride 0.9%. 1000 milliLiter(s) (75 mL/Hr) IV Continuous <Continuous>    MEDICATIONS  (PRN):  acetaminophen   Tablet .. 650 milliGRAM(s) Oral every 6 hours PRN Moderate Pain (4 - 6)  ondansetron   Solution 4 milliGRAM(s) Oral every 8 hours PRN Nausea      __________________________________________________  REVIEW OF SYSTEMS:    CONSTITUTIONAL: No fever,   EYES: no acute visual disturbances  NECK: No pain or stiffness  RESPIRATORY: + cough; No shortness of breath  CARDIOVASCULAR: No chest pain, no palpitations  GASTROINTESTINAL: No pain. + nausea; no vomiting; No diarrhea   NEUROLOGICAL: No headache or numbness, no tremors  MUSCULOSKELETAL: No joint pain, no muscle pain  GENITOURINARY: no dysuria, no frequency, no hesitancy  PSYCHIATRY: no depression , no anxiety  ALL OTHER  ROS negative        Vital Signs Last 24 Hrs  T(C): 37 (31 Jan 2019 07:46), Max: 37.1 (30 Jan 2019 20:41)  T(F): 98.6 (31 Jan 2019 07:46), Max: 98.8 (30 Jan 2019 20:41)  HR: 80 (31 Jan 2019 07:46) (56 - 80)  BP: 120/65 (31 Jan 2019 07:46) (115/58 - 141/65)  BP(mean): --  RR: 18 (31 Jan 2019 07:46) (16 - 18)  SpO2: 95% (31 Jan 2019 07:46) (95% - 99%)    ________________________________________________  PHYSICAL EXAM:  GENERAL: NAD, resting comfortably in bed  HEENT: Normocephalic; conjunctivae and sclerae clear; moist mucous membranes;   NECK : supple  CHEST/LUNG: Clear to auscultation bilaterally with good air entry   HEART: S1 S2  regular; no murmurs, gallops or rubs  ABDOMEN: Soft, Nontender, Nondistended; Bowel sounds present  EXTREMITIES: no cyanosis; no edema; no calf tenderness  NERVOUS SYSTEM:  Awake and alert; Oriented  to place, person and time ; no new deficits    _________________________________________________  LABS:    Blood work from prior reviewed.           RADIOLOGY & ADDITIONAL TESTS:    Imaging Personally Reviewed:  YES    < from: NM Pulmonary Ventilation/Perfusion Scan (01.29.19 @ 08:55) >  IMPRESSION: Abnormal ventilation/perfusion lung scan. High probability of   pulmonary embolus.    < end of copied text >      < from: US Duplex Carotid Arteries Complete, Bilateral (01.29.19 @ 10:52) >  No sonographic evidence of hemodynamically significant   stenosis.    < end of copied text >      < from: US Duplex Venous Lower Ext Ltd, Right (01.28.19 @ 20:51) >  No evidence of DVT.      < end of copied text >      Consultant(s) Notes Reviewed:   YES    Care Discussed with Consultants : YES    Plan of care was discussed with patient and /or primary care giver; all questions and concerns were addressed and care was aligned with patient's wishes.

## 2019-01-31 NOTE — PROGRESS NOTE ADULT - PROBLEM SELECTOR PLAN 3
Elevated liver enzymes  f/u CMP in AM  tylenol PRN only if liver enzymes  WNL or trending down. Elevated liver enzymes  f/u CMP in AM  Tylenol PRN only if liver enzymes WNL or trending down.

## 2019-01-31 NOTE — PROGRESS NOTE ADULT - SUBJECTIVE AND OBJECTIVE BOX
CHIEF COMPLAINT:Patient is a 68y old  Female who presents with a chief complaint of near syncope, cough, SOB. Pt appears comfortable.    	  REVIEW OF SYSTEMS:  CONSTITUTIONAL: No fever, weight loss, or fatigue  EYES: No eye pain, visual disturbances, or discharge  ENT:  No difficulty hearing, tinnitus, vertigo; No sinus or throat pain  NECK: No pain or stiffness  RESPIRATORY: + cough, No wheezing, chills or hemoptysis; No Shortness of Breath  CARDIOVASCULAR: No chest pain, palpitations, passing out, dizziness, or leg swelling  GASTROINTESTINAL: No abdominal or epigastric pain. No nausea, vomiting, or hematemesis; No diarrhea or constipation. No melena or hematochezia.  GENITOURINARY: No dysuria, frequency, hematuria, or incontinence  NEUROLOGICAL: No headaches, memory loss, loss of strength, numbness, or tremors  SKIN: No itching, burning, rashes, or lesions   LYMPH Nodes: No enlarged glands  ENDOCRINE: No heat or cold intolerance; No hair loss  MUSCULOSKELETAL: No joint pain or swelling; No muscle, back, or extremity pain  PSYCHIATRIC: No depression, anxiety, mood swings, or difficulty sleeping  HEME/LYMPH: No easy bruising, or bleeding gums  ALLERGY AND IMMUNOLOGIC: No hives or eczema	    PHYSICAL EXAM:  T(C): 37 (01-31-19 @ 07:46), Max: 37.1 (01-30-19 @ 20:41)  HR: 80 (01-31-19 @ 07:46) (58 - 80)  BP: 120/65 (01-31-19 @ 07:46) (119/53 - 141/65)  RR: 18 (01-31-19 @ 07:46) (16 - 18)  SpO2: 95% (01-31-19 @ 07:46) (95% - 97%)  Wt(kg): --  I&O's Summary      Appearance: Normal	  HEENT:   Normal oral mucosa, PERRL, EOMI	  Lymphatic: No lymphadenopathy  Cardiovascular: Normal S1 S2, No JVD, No murmurs, No edema  Respiratory: Lungs clear to auscultation	  Psychiatry: A & O x 3, Mood & affect appropriate  Gastrointestinal:  Soft, Non-tender, + BS	  Skin: No rashes, No ecchymoses, No cyanosis	  Neurologic: Non-focal  Extremities: Normal range of motion, No clubbing, cyanosis or edema  Vascular: Peripheral pulses palpable 2+ bilaterally    MEDICATIONS  (STANDING):  aspirin enteric coated 81 milliGRAM(s) Oral daily  benzonatate 100 milliGRAM(s) Oral three times a day  enoxaparin Injectable 90 milliGRAM(s) SubCutaneous every 12 hours  fluticasone propionate 50 MICROgram(s)/spray Nasal Spray 1 Spray(s) Both Nostrils two times a day  guaiFENesin    Syrup 100 milliGRAM(s) Oral every 6 hours  levothyroxine 75 MICROGram(s) Oral daily  oseltamivir 75 milliGRAM(s) Oral every 12 hours  pantoprazole    Tablet 40 milliGRAM(s) Oral before breakfast  senna 2 Tablet(s) Oral at bedtime  sodium chloride 0.65% Nasal 1 Spray(s) Both Nostrils two times a day      	  LABS:	 	    Lipid Profile: Cholesterol 130  LDL 71  HDL 53  TG 31    HgA1c: Hemoglobin A1C, Whole Blood: 5.5 % (01-29 @ 09:13)    TSH: Thyroid Stimulating Hormone, Serum: 2.01 uU/mL (01-29 @ 06:36)  Thyroid Stimulating Hormone, Serum: 2.36 uU/mL (01-28 @ 20:37)

## 2019-01-31 NOTE — PROGRESS NOTE ADULT - SUBJECTIVE AND OBJECTIVE BOX
Patient is a 68y old  Female who presents with a chief complaint of near syncope, cough, SOB (31 Jan 2019 12:57)    Awake,alert , lying in bed in NAD. Pt c/o cough with clear/white mucus.       INTERVAL HPI/OVERNIGHT EVENTS:      VITAL SIGNS:  T(F): 98.6 (01-31-19 @ 07:46)  HR: 80 (01-31-19 @ 07:46)  BP: 120/65 (01-31-19 @ 07:46)  RR: 18 (01-31-19 @ 07:46)  SpO2: 95% (01-31-19 @ 07:46)  Wt(kg): --  I&O's Detail          REVIEW OF SYSTEMS:    CONSTITUTIONAL:  No fevers, chills, sweats    HEENT:  Eyes:  No diplopia or blurred vision. ENT:  No earache, sore throat or runny nose.    CARDIOVASCULAR:  No pressure, squeezing, tightness, or heaviness about the chest; no palpitations.    RESPIRATORY:  Per HPI    GASTROINTESTINAL:  No abdominal pain, nausea, vomiting or diarrhea.    GENITOURINARY:  No dysuria, frequency or urgency.    NEUROLOGIC:  No paresthesias, fasciculations, seizures or weakness.    PSYCHIATRIC:  No disorder of thought or mood.      PHYSICAL EXAM:    Constitutional: Well developed and nourished  Eyes:Perrla  ENMT: normal  Neck:supple  Respiratory: good air entry  Cardiovascular: S1 S2 regular  Gastrointestinal: Soft, Non tender  Extremities: No edema  Vascular:normal  Neurological:Awake, alert,Ox3  Musculoskeletal:Normal      MEDICATIONS  (STANDING):  aspirin enteric coated 81 milliGRAM(s) Oral daily  benzonatate 100 milliGRAM(s) Oral three times a day  enoxaparin Injectable 90 milliGRAM(s) SubCutaneous every 12 hours  fluticasone propionate 50 MICROgram(s)/spray Nasal Spray 1 Spray(s) Both Nostrils two times a day  guaiFENesin    Syrup 100 milliGRAM(s) Oral every 6 hours  levothyroxine 75 MICROGram(s) Oral daily  oseltamivir 75 milliGRAM(s) Oral every 12 hours  pantoprazole    Tablet 40 milliGRAM(s) Oral before breakfast  senna 2 Tablet(s) Oral at bedtime  sodium chloride 0.65% Nasal 1 Spray(s) Both Nostrils two times a day    MEDICATIONS  (PRN):  acetaminophen   Tablet .. 650 milliGRAM(s) Oral every 6 hours PRN Moderate Pain (4 - 6)  ondansetron   Solution 4 milliGRAM(s) Oral every 8 hours PRN Nausea      Allergies    iodine (Angioedema)  penicillin (Rash)    Intolerances        LABS:                    CAPILLARY BLOOD GLUCOSE        pro-bnp -- 01-28 @ 17:17     d-dimer 335  01-28 @ 17:17      RADIOLOGY & ADDITIONAL TESTS:    CXR:  < from: Xray Chest 1 View AP/PA (01.28.19 @ 18:11) >  Impression:    No acute pulmonary process demonstrated.    < end of copied text >      < from: NM Pulmonary Ventilation/Perfusion Scan (01.29.19 @ 08:55) >  IMPRESSION: Abnormal ventilation/perfusion lung scan. High probability of   pulmonary embolus.    < end of copied text >    < from: US Duplex Carotid Arteries Complete, Bilateral (01.29.19 @ 10:52) >  IMPRESSION:  All peak systolic and end diastolic velocities are within   normal limits. No sonographic evidence of hemodynamically significant   stenosis.    < end of copied text >    < from: Transthoracic Echocardiogram (01.29.19 @ 07:46) >  CONCLUSIONS:  1. Normal mitral valve.  2. Normal trileaflet aortic valve.  3. Aortic Root: 3.2 cm.  4. Normal left atrium.  LA volume index = 20 cc/m2.  5. Normal left ventricular internal dimensions and wall  thicknesses.  6. Normal Left Ventricular Systolic Function,  (EF = 55 to  60%)  7. Normal diastolic function.  8. Normal right atrium.  9. Normal right ventricular size and function.  10. RV systolic pressure is 20 mm Hg.  11. There is trace tricuspid regurgitation.  12. Normal pulmonic valve.  13. Normal pericardium with no pericardial effusion.    < end of copied text > Patient is a 68y old  Female who presents with a chief complaint of near syncope, cough, SOB (31 Jan 2019 12:57)    Awake, alert, lying in bed in NAD. Pt with right ankle cast. Still with cough today, reports clear/white mucus. + for Influenza A, on Tamiflu. Offers no new complaints.    INTERVAL HPI/OVERNIGHT EVENTS:    VITAL SIGNS:  T(F): 98.6 (01-31-19 @ 07:46)  HR: 80 (01-31-19 @ 07:46)  BP: 120/65 (01-31-19 @ 07:46)  RR: 18 (01-31-19 @ 07:46)  SpO2: 95% (01-31-19 @ 07:46)  Wt(kg): --  I&O's Detail        REVIEW OF SYSTEMS:    CONSTITUTIONAL:  No fevers, chills, sweats    HEENT:  Eyes:  No diplopia or blurred vision. ENT:  No earache, sore throat or runny nose.    CARDIOVASCULAR:  No pressure, squeezing, tightness, or heaviness about the chest; no palpitations.    RESPIRATORY:  Per HPI    GASTROINTESTINAL:  No abdominal pain, nausea, vomiting or diarrhea.    GENITOURINARY:  No dysuria, frequency or urgency.    NEUROLOGIC:  No paresthesias, fasciculations, seizures or weakness.    PSYCHIATRIC:  No disorder of thought or mood.      PHYSICAL EXAM:    Constitutional: Well developed and nourished  Eyes:Perrla  ENMT: normal  Neck:supple  Respiratory: good air entry  Cardiovascular: S1 S2 regular  Gastrointestinal: Soft, Non tender  Extremities: No edema  Vascular:normal  Neurological:Awake, alert,Ox3  Musculoskeletal:Normal      MEDICATIONS  (STANDING):  aspirin enteric coated 81 milliGRAM(s) Oral daily  benzonatate 100 milliGRAM(s) Oral three times a day  enoxaparin Injectable 90 milliGRAM(s) SubCutaneous every 12 hours  fluticasone propionate 50 MICROgram(s)/spray Nasal Spray 1 Spray(s) Both Nostrils two times a day  guaiFENesin    Syrup 100 milliGRAM(s) Oral every 6 hours  levothyroxine 75 MICROGram(s) Oral daily  oseltamivir 75 milliGRAM(s) Oral every 12 hours  pantoprazole    Tablet 40 milliGRAM(s) Oral before breakfast  senna 2 Tablet(s) Oral at bedtime  sodium chloride 0.65% Nasal 1 Spray(s) Both Nostrils two times a day    MEDICATIONS  (PRN):  acetaminophen   Tablet .. 650 milliGRAM(s) Oral every 6 hours PRN Moderate Pain (4 - 6)  ondansetron   Solution 4 milliGRAM(s) Oral every 8 hours PRN Nausea      Allergies    iodine (Angioedema)  penicillin (Rash)    Intolerances        LABS:    CAPILLARY BLOOD GLUCOSE    pro-bnp -- 01-28 @ 17:17     d-dimer 335  01-28 @ 17:17      RADIOLOGY & ADDITIONAL TESTS:    CXR:  < from: Xray Chest 1 View AP/PA (01.28.19 @ 18:11) >  Impression:    No acute pulmonary process demonstrated.    < end of copied text >      < from: NM Pulmonary Ventilation/Perfusion Scan (01.29.19 @ 08:55) >  IMPRESSION: Abnormal ventilation/perfusion lung scan. High probability of   pulmonary embolus.    < end of copied text >    < from: US Duplex Carotid Arteries Complete, Bilateral (01.29.19 @ 10:52) >  IMPRESSION:  All peak systolic and end diastolic velocities are within   normal limits. No sonographic evidence of hemodynamically significant   stenosis.    < end of copied text >    < from: Transthoracic Echocardiogram (01.29.19 @ 07:46) >  CONCLUSIONS:  1. Normal mitral valve.  2. Normal trileaflet aortic valve.  3. Aortic Root: 3.2 cm.  4. Normal left atrium.  LA volume index = 20 cc/m2.  5. Normal left ventricular internal dimensions and wall  thicknesses.  6. Normal Left Ventricular Systolic Function,  (EF = 55 to  60%)  7. Normal diastolic function.  8. Normal right atrium.  9. Normal right ventricular size and function.  10. RV systolic pressure is 20 mm Hg.  11. There is trace tricuspid regurgitation.  12. Normal pulmonic valve.  13. Normal pericardium with no pericardial effusion.    < end of copied text >

## 2019-01-31 NOTE — CONSULT NOTE ADULT - PROBLEM SELECTOR RECOMMENDATION 9
most likely due to surgery to ankle and immobilization.  The small aneurysm should not post much risk for bleeding if she takes AC for 3 months.  There is no clear indication for ASA.  Will stop it.  Risk of AC is discussed with her and her daughter.  We can switch to AC.  she needs it for 3 months.

## 2019-02-01 ENCOUNTER — TRANSCRIPTION ENCOUNTER (OUTPATIENT)
Age: 69
End: 2019-02-01

## 2019-02-01 RX ORDER — OMEPRAZOLE 10 MG/1
1 CAPSULE, DELAYED RELEASE ORAL
Qty: 0 | Refills: 0 | COMMUNITY

## 2019-02-01 RX ORDER — LORATADINE 10 MG/1
10 TABLET ORAL DAILY
Qty: 0 | Refills: 0 | Status: DISCONTINUED | OUTPATIENT
Start: 2019-02-01 | End: 2019-02-02

## 2019-02-01 RX ORDER — LEVOTHYROXINE SODIUM 125 MCG
1 TABLET ORAL
Qty: 0 | Refills: 0 | COMMUNITY
Start: 2019-02-01

## 2019-02-01 RX ORDER — APIXABAN 2.5 MG/1
2 TABLET, FILM COATED ORAL
Qty: 28 | Refills: 0 | OUTPATIENT
Start: 2019-02-01 | End: 2019-02-07

## 2019-02-01 RX ORDER — FLUTICASONE PROPIONATE 50 MCG
1 SPRAY, SUSPENSION NASAL
Qty: 1 | Refills: 0 | OUTPATIENT
Start: 2019-02-01 | End: 2019-02-10

## 2019-02-01 RX ORDER — APIXABAN 2.5 MG/1
1 TABLET, FILM COATED ORAL
Qty: 60 | Refills: 0 | OUTPATIENT
Start: 2019-02-01 | End: 2019-03-02

## 2019-02-01 RX ORDER — PANTOPRAZOLE SODIUM 20 MG/1
1 TABLET, DELAYED RELEASE ORAL
Qty: 10 | Refills: 0 | OUTPATIENT
Start: 2019-02-01 | End: 2019-02-10

## 2019-02-01 RX ORDER — APIXABAN 2.5 MG/1
2 TABLET, FILM COATED ORAL
Qty: 0 | Refills: 0 | COMMUNITY
Start: 2019-02-01

## 2019-02-01 RX ORDER — LEVOTHYROXINE SODIUM 125 MCG
1 TABLET ORAL
Qty: 0 | Refills: 0 | COMMUNITY

## 2019-02-01 RX ORDER — APIXABAN 2.5 MG/1
10 TABLET, FILM COATED ORAL EVERY 12 HOURS
Qty: 0 | Refills: 0 | Status: DISCONTINUED | OUTPATIENT
Start: 2019-02-01 | End: 2019-02-02

## 2019-02-01 RX ORDER — ACETAMINOPHEN 500 MG
2 TABLET ORAL
Qty: 0 | Refills: 0 | COMMUNITY
Start: 2019-02-01

## 2019-02-01 RX ORDER — LORATADINE 10 MG/1
1 TABLET ORAL
Qty: 10 | Refills: 0 | OUTPATIENT
Start: 2019-02-01 | End: 2019-02-10

## 2019-02-01 RX ADMIN — Medication 650 MILLIGRAM(S): at 23:15

## 2019-02-01 RX ADMIN — Medication 1 SPRAY(S): at 06:16

## 2019-02-01 RX ADMIN — LORATADINE 10 MILLIGRAM(S): 10 TABLET ORAL at 17:13

## 2019-02-01 RX ADMIN — Medication 75 MILLIGRAM(S): at 17:14

## 2019-02-01 RX ADMIN — SENNA PLUS 2 TABLET(S): 8.6 TABLET ORAL at 22:37

## 2019-02-01 RX ADMIN — PANTOPRAZOLE SODIUM 40 MILLIGRAM(S): 20 TABLET, DELAYED RELEASE ORAL at 06:16

## 2019-02-01 RX ADMIN — ENOXAPARIN SODIUM 90 MILLIGRAM(S): 100 INJECTION SUBCUTANEOUS at 00:07

## 2019-02-01 RX ADMIN — Medication 1 SPRAY(S): at 17:14

## 2019-02-01 RX ADMIN — Medication 100 MILLIGRAM(S): at 00:07

## 2019-02-01 RX ADMIN — APIXABAN 10 MILLIGRAM(S): 2.5 TABLET, FILM COATED ORAL at 17:14

## 2019-02-01 RX ADMIN — Medication 650 MILLIGRAM(S): at 15:49

## 2019-02-01 RX ADMIN — Medication 75 MILLIGRAM(S): at 06:16

## 2019-02-01 RX ADMIN — Medication 650 MILLIGRAM(S): at 14:46

## 2019-02-01 RX ADMIN — Medication 100 MILLIGRAM(S): at 22:37

## 2019-02-01 RX ADMIN — Medication 75 MICROGRAM(S): at 06:16

## 2019-02-01 RX ADMIN — Medication 650 MILLIGRAM(S): at 22:36

## 2019-02-01 NOTE — DISCHARGE NOTE ADULT - CARE PLAN
Principal Discharge DX:	Pulmonary embolism  Goal:	Eliquis course for 3 months  Assessment and plan of treatment:	You were found to clots in your lungs on VQ scan, most likely provoked due to recent ankle surgery. You are recommended to take Eliquis 10 mg (2 tabs) two times a day till Feb 8 then from Feb 9 take 5 mg (one tab) two times a day for 3 months and follow up with PCP. Lower Extremity doppler was negative for clots. You are recommended to watch for any abnormal bleeding as you are on blood thinner medication and come to ED for black stools or any abnormal bleeding. Please follow up with PCP  Secondary Diagnosis:	Flu  Assessment and plan of treatment:	You were found to have flu. You are recommended to take Tamiflu for 10 days, take Tylenol for pain and follow up with PCP  Secondary Diagnosis:	Hypothyroid  Assessment and plan of treatment:	Continue with your home medications as advised and follow up with PCP  Secondary Diagnosis:	Cerebral aneurysm  Assessment and plan of treatment:	You have history of cerebral aneurysm. You are recommended to follow up with PCP and your out patient neurologist

## 2019-02-01 NOTE — DISCHARGE NOTE ADULT - PLAN OF CARE
Eliquis course for 3 months You were found to clots in your lungs on VQ scan, most likely provoked due to recent ankle surgery. You are recommended to take Eliquis 10 mg (2 tabs) two times a day till Feb 8 then from Feb 9 take 5 mg (one tab) two times a day for 3 months and follow up with PCP. Lower Extremity doppler was negative for clots. You are recommended to watch for any abnormal bleeding as you are on blood thinner medication and come to ED for black stools or any abnormal bleeding. Please follow up with PCP You were found to have flu. You are recommended to take Tamiflu for 10 days, take Tylenol for pain and follow up with PCP Continue with your home medications as advised and follow up with PCP You have history of cerebral aneurysm. You are recommended to follow up with PCP and your out patient neurologist

## 2019-02-01 NOTE — PROGRESS NOTE ADULT - SUBJECTIVE AND OBJECTIVE BOX
Patient is a 68y old  Female who presents with a chief complaint of near syncope, cough, SOB (01 Feb 2019 12:21)    Pt awake, alert lying in bed in NAD. Right leg elevated with cast. Pt reports still coughing today, with hoarseness due to persistent need to cough. Denies discolored mucus production. On Droplet precaution for Influenza A.     INTERVAL HPI/OVERNIGHT EVENTS:      VITAL SIGNS:  T(F): 98.6 (02-01-19 @ 08:19)  HR: 63 (02-01-19 @ 08:19)  BP: 120/62 (02-01-19 @ 08:19)  RR: 17 (02-01-19 @ 08:19)  SpO2: 99% (02-01-19 @ 08:19)  Wt(kg): --  I&O's Detail          REVIEW OF SYSTEMS:    CONSTITUTIONAL:  No fevers, chills, sweats    HEENT:  Eyes:  No diplopia or blurred vision. ENT:  No earache, sore throat or runny nose.    CARDIOVASCULAR:  No pressure, squeezing, tightness, or heaviness about the chest; no palpitations.    RESPIRATORY:  Per HPI    GASTROINTESTINAL:  No abdominal pain, nausea, vomiting or diarrhea.    GENITOURINARY:  No dysuria, frequency or urgency.    NEUROLOGIC:  No paresthesias, fasciculations, seizures or weakness.    PSYCHIATRIC:  No disorder of thought or mood.      PHYSICAL EXAM:    Constitutional: Well developed and nourished  Eyes:Perrla  ENMT: normal  Neck:supple  Respiratory: good air entry, no adventitious breath sounds.  Cardiovascular: S1 S2 regular  Gastrointestinal: Soft, Non tender  Extremities: No edema  Vascular:normal  Neurological:Awake, alert,Ox3  Musculoskeletal:Normal      MEDICATIONS  (STANDING):  apixaban 10 milliGRAM(s) Oral every 12 hours  benzonatate 100 milliGRAM(s) Oral three times a day  fluticasone propionate 50 MICROgram(s)/spray Nasal Spray 1 Spray(s) Both Nostrils two times a day  guaiFENesin    Syrup 100 milliGRAM(s) Oral every 6 hours  levothyroxine 75 MICROGram(s) Oral daily  oseltamivir 75 milliGRAM(s) Oral every 12 hours  pantoprazole    Tablet 40 milliGRAM(s) Oral before breakfast  senna 2 Tablet(s) Oral at bedtime  sodium chloride 0.65% Nasal 1 Spray(s) Both Nostrils two times a day    MEDICATIONS  (PRN):  acetaminophen   Tablet .. 650 milliGRAM(s) Oral every 6 hours PRN Moderate Pain (4 - 6)  ondansetron   Solution 4 milliGRAM(s) Oral every 8 hours PRN Nausea      Allergies    iodine (Angioedema)  penicillin (Rash)    Intolerances        LABS:    CAPILLARY BLOOD GLUCOSE    pro-bnp -- 01-28 @ 17:17     d-dimer 335  01-28 @ 17:17      RADIOLOGY & ADDITIONAL TESTS:    CXR:  < from: Xray Chest 1 View AP/PA (01.28.19 @ 18:11) >  Impression:    No acute pulmonary process demonstrated.        < end of copied text >    Ct scan chest:    ekg;    echo:

## 2019-02-01 NOTE — DISCHARGE NOTE ADULT - MEDICATION SUMMARY - MEDICATIONS TO TAKE
I will START or STAY ON the medications listed below when I get home from the hospital:    acetaminophen 325 mg oral tablet  -- 2 tab(s) by mouth every 6 hours, As needed, Moderate Pain (4 - 6)  -- Indication: For Pain     aspirin 81 mg oral delayed release tablet  -- 1 tab(s) by mouth once a day  -- Indication: For Aniplatelet     apixaban 5 mg oral tablet  -- 2 tab(s) by mouth every 12 hours  -- Indication: For DVT with PE , take 2 tabs (10mg) two times a day till Feb 8 then take 1 tab(5 mg ) two times a day from Feb 9 for 3 months      Eliquis 5 mg oral tablet  -- 1 tab(s) by mouth 2 times a day   -- Check with your doctor before becoming pregnant.  It is very important that you take or use this exactly as directed.  Do not skip doses or discontinue unless directed by your doctor.  Obtain medical advice before taking any non-prescription drugs as some may affect the action of this medication.    -- Indication: For DVT with PE , take 2 tabs (10mg) two times a day till Feb 8 then take 1 tab(5 mg ) two times a day from Feb 9 for 3 months     loratadine 10 mg oral tablet  -- 1 tab(s) by mouth once a day  -- Indication: For Antihistamine     benzonatate 100 mg oral capsule  -- 1 cap(s) by mouth 3 times a day  -- Indication: For Cough    oseltamivir 75 mg oral capsule  -- 1 cap(s) by mouth every 12 hours  -- Indication: For  Flu     guaiFENesin 100 mg/5 mL oral liquid  -- 5 milliliter(s) by mouth every 6 hours  -- Indication: For Cough     fluticasone 50 mcg/inh nasal spray  -- 1 spray(s) into nose 2 times a day  -- Indication: For Flu     pantoprazole 40 mg oral delayed release tablet  -- 1 tab(s) by mouth once a day (before a meal)  -- Indication: For GI agent     levothyroxine 75 mcg (0.075 mg) oral tablet  -- 1 tab(s) by mouth once a day  -- Indication: For Thyroid

## 2019-02-01 NOTE — PROGRESS NOTE ADULT - SUBJECTIVE AND OBJECTIVE BOX
PGY 1 Note discussed with supervising resident and primary attending    Patient is a 68y old  Female who presents with a chief complaint of near syncope, cough, SOB (01 Feb 2019 14:03)      INTERVAL HPI/OVERNIGHT EVENTS:   Patient seen and examined at the bedside,   no new complains Complains of headache , body ache due to flu and cough     MEDICATIONS  (STANDING):  apixaban 10 milliGRAM(s) Oral every 12 hours  benzonatate 100 milliGRAM(s) Oral three times a day  fluticasone propionate 50 MICROgram(s)/spray Nasal Spray 1 Spray(s) Both Nostrils two times a day  guaiFENesin    Syrup 100 milliGRAM(s) Oral every 6 hours  levothyroxine 75 MICROGram(s) Oral daily  oseltamivir 75 milliGRAM(s) Oral every 12 hours  pantoprazole    Tablet 40 milliGRAM(s) Oral before breakfast  senna 2 Tablet(s) Oral at bedtime  sodium chloride 0.65% Nasal 1 Spray(s) Both Nostrils two times a day    MEDICATIONS  (PRN):  acetaminophen   Tablet .. 650 milliGRAM(s) Oral every 6 hours PRN Moderate Pain (4 - 6)  ondansetron   Solution 4 milliGRAM(s) Oral every 8 hours PRN Nausea      __________________________________________________  REVIEW OF SYSTEMS:    as above        Vital Signs Last 24 Hrs  T(C): 36.9 (01 Feb 2019 16:07), Max: 37 (01 Feb 2019 08:19)  T(F): 98.4 (01 Feb 2019 16:07), Max: 98.6 (01 Feb 2019 08:19)  HR: 59 (01 Feb 2019 16:07) (59 - 63)  BP: 112/49 (01 Feb 2019 16:07) (112/49 - 143/80)  BP(mean): --  RR: 18 (01 Feb 2019 16:07) (16 - 18)  SpO2: 99% (01 Feb 2019 16:07) (99% - 100%)    ________________________________________________  PHYSICAL EXAM:  GENERAL: NAD   HEENT: Normocephalic;  conjunctivae and sclerae clear; moist mucous membranes;   NECK : supple  CHEST/LUNG: Clear to auscultation bilaterally with good air entry   HEART: S1 S2  regular; no murmurs, gallops or rubs  ABDOMEN: Soft, Nontender, Nondistended; Bowel sounds present  EXTREMITIES: no cyanosis; no edema; no calf tenderness  SKIN: warm and dry; no rash  NERVOUS SYSTEM:  Awake and alert; Oriented  to place, person and time ; no new deficits    _________________________________________________  LABS:              CAPILLARY BLOOD GLUCOSE            RADIOLOGY & ADDITIONAL TESTS:    Imaging Personally Reviewed:  YES/    Consultant(s) Notes Reviewed:   YES/     Care Discussed with Consultants :     Plan of care was discussed with patient and /or primary care giver; all questions and concerns were addressed and care was aligned with patient's wishes.

## 2019-02-01 NOTE — PROGRESS NOTE ADULT - PROBLEM SELECTOR PLAN 3
- Patient with history of cerebral aneurysm, Neurology consult appreciated.   - Called patient's out patient Neurologist Dr. Willam Zimmer and requested for the most recent clinic note and MRI/MRA brain results  no records faxed by them   OP follow recommended

## 2019-02-01 NOTE — PROGRESS NOTE ADULT - ASSESSMENT
67 y/o F with PMHx of Hypothyroid, GERD, R ankle injury s/p surgery 1 months ago presents to the ED with complaints of dizziness for 3 days accompanied by SOB, cough, nasal congestion, nausea, vomiting, and diaphoresis. Found to have Flu and PE.
67 y/o F with a significant PMHx of R ankle injury and no significant PSHx presents to the ED with complaints of dizziness for 3 days accompanied by SOB, cough, nasal congestion, urinary frequency, low urinary output, nausea, vomiting, and diaphoresis, PE and Influenza A.  1.D/C IVF.  2.ABX.  3.PE-full dose lovenox to start noac.  4.Hx of CNS aneurysm-Neurology eval noted.  5.Hypothyroidism-synthroid.  6.PPI.
67 y/o F with a significant PMHx of R ankle injury and no significant PSHx presents to the ED with complaints of dizziness for 3 days accompanied by SOB, cough, nasal congestion, urinary frequency, low urinary output, nausea, vomiting, and diaphoresis, PE and Influenza A.  1.IVF.  2.ABX.  3.PE-full dose lovenox.  4.Hx of CNS aneurysm-Neurology eval noted.  5.Hypothyroidism-synthroid.  6.PPI.
67 y/o F with a significant PMHx of R ankle injury and no significant PSHx presents to the ED with complaints of dizziness for 3 days accompanied by SOB, cough, nasal congestion, urinary frequency, low urinary output, nausea, vomiting, and diaphoresis, PE and Influenza A.  1.PT.  2.ABX.  3.PE-NOAC.  4.Hx of CNS aneurysm-Neurology eval noted.  5.Hypothyroidism-synthroid.  6.PPI.
NP Note discussed with  Primary Attending    Patient is a 68y old  Female who presents with a chief complaint of near syncope, cough, SOB (2019 21:22)      INTERVAL HPI/OVERNIGHT EVENTS: no new complaints    MEDICATIONS  (STANDING):  aspirin enteric coated 81 milliGRAM(s) Oral daily  enoxaparin Injectable 90 milliGRAM(s) SubCutaneous every 12 hours  levothyroxine 75 MICROGram(s) Oral daily  oseltamivir 75 milliGRAM(s) Oral every 12 hours  pantoprazole    Tablet 40 milliGRAM(s) Oral before breakfast  senna 2 Tablet(s) Oral at bedtime  sodium chloride 0.9%. 1000 milliLiter(s) (75 mL/Hr) IV Continuous <Continuous>    MEDICATIONS  (PRN):  acetaminophen   Tablet .. 650 milliGRAM(s) Oral every 6 hours PRN Moderate Pain (4 - 6)      __________________________________________________  REVIEW OF SYSTEMS:    CONSTITUTIONAL: No fever,   EYES: no acute visual disturbances  NECK: No pain or stiffness  RESPIRATORY: + cough and SOB  CARDIOVASCULAR: No chest pain, no palpitations  GASTROINTESTINAL: No pain. No nausea or vomiting; No diarrhea   NEUROLOGICAL: No headache or numbness, no tremors  MUSCULOSKELETAL: No joint pain, no muscle pain  GENITOURINARY: no dysuria, no frequency, no hesitancy  PSYCHIATRY: no depression , no anxiety  ALL OTHER  ROS negative        Vital Signs Last 24 Hrs  T(C): 36.8 (2019 07:37), Max: 37.1 (2019 00:06)  T(F): 98.2 (2019 07:37), Max: 98.7 (2019 00:06)  HR: 57 (2019 07:37) (57 - 95)  BP: 118/53 (2019 07:37) (114/70 - 125/74)  BP(mean): --  RR: 18 (2019 07:37) (18 - 18)  SpO2: 99% (2019 07:37) (93% - 99%)    ________________________________________________  PHYSICAL EXAM:  GENERAL: NAD  HEENT: Normocephalic;  conjunctivae and sclerae clear; moist mucous membranes;   NECK : supple  CHEST/LUNG: coarse BS  and cough on inspiration  ABDOMEN: Soft, Nontender, Nondistended; Bowel sounds present  EXTREMITIES: rt ankle in cast + PP  SKIN: warm and dry; no rash  NERVOUS SYSTEM:  Awake and alert; Oriented  to place, person and time    _________________________________________________  LABS:                        11.7   4.7   )-----------( 247      ( 2019 06:36 )             34.9         136  |  104  |  6<L>  ----------------------------<  84  4.3   |  24  |  x     Ca    8.6      2019 10:03  Phos  3.5       Mg     2.1         TPro  x   /  Alb  3.0<L>  /  TBili  x   /  DBili  x   /  AST  x   /  ALT  x   /  AlkPhos  x         Urinalysis Basic - ( 2019 16:59 )    Color: Yellow / Appearance: Clear / S.010 / pH: x  Gluc: x / Ketone: Negative  / Bili: Negative / Urobili: Negative   Blood: x / Protein: Negative / Nitrite: Negative   Leuk Esterase: Negative / RBC: x / WBC x   Sq Epi: x / Non Sq Epi: x / Bacteria: x      CAPILLARY BLOOD GLUCOSE      POCT Blood Glucose.: 105 mg/dL (2019 16:02)        RADIOLOGY & ADDITIONAL TESTS:    Imaging Personally Reviewed:  YES    Consultant(s) Notes Reviewed:   YES  Care Discussed with Consultants : Dr Lucas and Dr. Woods    Plan of care was discussed with patient and /or primary care giver; all questions and concerns were addressed and care was aligned with patient's wishes.
67 y/o F with PMHx of Hypothyroid, GERD, R ankle injury s/p surgery 1 months ago presents to the ED with complaints of dizziness for 3 days accompanied by SOB, cough, nasal congestion, nausea, vomiting, and diaphoresis. Found to have Flu and PE.

## 2019-02-01 NOTE — DISCHARGE NOTE ADULT - PATIENT PORTAL LINK FT
You can access the [facility] Patient Portal, offered by [facility], by registering with the following website: [email or web address]

## 2019-02-01 NOTE — DISCHARGE NOTE ADULT - HOSPITAL COURSE
patient admitted with PE provoked due to recent ankle surgery , started on full dose Lovenox and discharged on Eliquis for 3 months. Doppler LE: negative     You also had flu, treated with Tamiflu and conservative management   Patient is medically stable to be discharged,.Case is discussed with the attending doctor

## 2019-02-01 NOTE — PROGRESS NOTE ADULT - SUBJECTIVE AND OBJECTIVE BOX
CHIEF COMPLAINT:Patient is a 68y old  Female who presents with a chief complaint of near syncope, cough, SOB.Pt appears comfortable.    	  REVIEW OF SYSTEMS:  CONSTITUTIONAL: No fever, weight loss, or fatigue  EYES: No eye pain, visual disturbances, or discharge  ENT:  No difficulty hearing, tinnitus, vertigo; No sinus or throat pain  NECK: No pain or stiffness  RESPIRATORY: No cough, wheezing, chills or hemoptysis; No Shortness of Breath  CARDIOVASCULAR: No chest pain, palpitations, passing out, dizziness, or leg swelling  GASTROINTESTINAL: No abdominal or epigastric pain. No nausea, vomiting, or hematemesis; No diarrhea or constipation. No melena or hematochezia.  GENITOURINARY: No dysuria, frequency, hematuria, or incontinence  NEUROLOGICAL: No headaches, memory loss, loss of strength, numbness, or tremors  SKIN: No itching, burning, rashes, or lesions   LYMPH Nodes: No enlarged glands  ENDOCRINE: No heat or cold intolerance; No hair loss  MUSCULOSKELETAL: No joint pain or swelling; No muscle, back, or extremity pain  PSYCHIATRIC: No depression, anxiety, mood swings, or difficulty sleeping  HEME/LYMPH: No easy bruising, or bleeding gums  ALLERGY AND IMMUNOLOGIC: No hives or eczema	      PHYSICAL EXAM:  T(C): 37 (02-01-19 @ 08:19), Max: 37 (02-01-19 @ 08:19)  HR: 63 (02-01-19 @ 08:19) (56 - 63)  BP: 120/62 (02-01-19 @ 08:19) (120/47 - 143/80)  RR: 17 (02-01-19 @ 08:19) (16 - 17)  SpO2: 99% (02-01-19 @ 08:19) (96% - 100%)      Appearance: Normal	  HEENT:   Normal oral mucosa, PERRL, EOMI	  Lymphatic: No lymphadenopathy  Cardiovascular: Normal S1 S2, No JVD, No murmurs, No edema  Respiratory: Lungs clear to auscultation	  Psychiatry: A & O x 3, Mood & affect appropriate  Gastrointestinal:  Soft, Non-tender, + BS	  Skin: No rashes, No ecchymoses, No cyanosis	  Neurologic: Non-focal  Extremities: Normal range of motion, No clubbing, cyanosis or edema  Vascular: Peripheral pulses palpable 2+ bilaterally    MEDICATIONS  (STANDING):  apixaban 10 milliGRAM(s) Oral every 12 hours  benzonatate 100 milliGRAM(s) Oral three times a day  fluticasone propionate 50 MICROgram(s)/spray Nasal Spray 1 Spray(s) Both Nostrils two times a day  guaiFENesin    Syrup 100 milliGRAM(s) Oral every 6 hours  levothyroxine 75 MICROGram(s) Oral daily  oseltamivir 75 milliGRAM(s) Oral every 12 hours  pantoprazole    Tablet 40 milliGRAM(s) Oral before breakfast  senna 2 Tablet(s) Oral at bedtime  sodium chloride 0.65% Nasal 1 Spray(s) Both Nostrils two times a day        	  LABS:	 	    Lipid Profile: Cholesterol 130  LDL 71  HDL 53  TG 31    HgA1c: Hemoglobin A1C, Whole Blood: 5.5 % (01-29 @ 09:13)    TSH: Thyroid Stimulating Hormone, Serum: 2.01 uU/mL (01-29 @ 06:36)  Thyroid Stimulating Hormone, Serum: 2.36 uU/mL (01-28 @ 20:37)

## 2019-02-01 NOTE — PROGRESS NOTE ADULT - SUBJECTIVE AND OBJECTIVE BOX
Patient is a 68y old  Female who presents with a chief complaint of near syncope, cough, SOB (2019 13:05)    pt seen in icu [  ], reg med floor [   ], bed [  ], chair at bedside [   ], a+o x3 [  ], lethargic [  ],  nad [  ]    conte [  ], ngt [  ], peg [  ], et tube [  ], cent line [  ], picc line [  ]        Allergies    iodine (Angioedema)  penicillin (Rash)        Vitals    T(F): 98.6 (19 @ 08:19), Max: 98.6 (19 @ 08:19)  HR: 63 (19 @ 08:19) (56 - 63)  BP: 120/62 (19 @ 08:19) (120/47 - 143/80)  RR: 17 (19 @ 08:19) (16 - 17)  SpO2: 99% (19 @ 08:19) (96% - 100%)  Wt(kg): --  CAPILLARY BLOOD GLUCOSE          Labs                      .Urine Clean Catch (Midstream)   @ 23:55   <10,000 CFU/ml Normal Urogenital shameka present  --  --          Radiology Results      Meds    MEDICATIONS  (STANDING):  apixaban 10 milliGRAM(s) Oral every 12 hours  benzonatate 100 milliGRAM(s) Oral three times a day  fluticasone propionate 50 MICROgram(s)/spray Nasal Spray 1 Spray(s) Both Nostrils two times a day  guaiFENesin    Syrup 100 milliGRAM(s) Oral every 6 hours  levothyroxine 75 MICROGram(s) Oral daily  oseltamivir 75 milliGRAM(s) Oral every 12 hours  pantoprazole    Tablet 40 milliGRAM(s) Oral before breakfast  senna 2 Tablet(s) Oral at bedtime  sodium chloride 0.65% Nasal 1 Spray(s) Both Nostrils two times a day      MEDICATIONS  (PRN):  acetaminophen   Tablet .. 650 milliGRAM(s) Oral every 6 hours PRN Moderate Pain (4 - 6)  ondansetron   Solution 4 milliGRAM(s) Oral every 8 hours PRN Nausea      Physical Exam    Neuro :  no focal deficits  Respiratory: CTA B/L  CV: RRR, S1S2, no murmurs,   Abdominal: Soft, NT, ND +BS,  Extremities: No edema, + peripheral pulses    ASSESSMENT    Dizziness and giddiness  THORNE (nonalcoholic steatohepatitis)  Chronic constipation  Hemorrhoids  Diverticulosis  Gastritis  Hernia  Hypothyroid  Ankle injury  History of ankle surgery  H/O:   H/O hysterectomy with unilateral oophorectomy  No significant past surgical history      PLAN Patient is a 68y old  Female who presents with a chief complaint of near syncope, cough, SOB (2019 13:05)    pt seen in ed tele [  ], reg med floor [  x ], bed [x ], chair at bedside [   ], a+o x3 [ x ], lethargic [  ],  nad [ x ]    still with c/o persistent cough      Allergies    iodine (Angioedema)  penicillin (Rash)        Vitals    T(F): 98.6 (19 @ 08:19), Max: 98.6 (19 @ 08:19)  HR: 63 (19 @ 08:19) (56 - 63)  BP: 120/62 (19 @ 08:19) (120/47 - 143/80)  RR: 17 (19 @ 08:19) (16 - 17)  SpO2: 99% (19 @ 08:19) (96% - 100%)  Wt(kg): --  CAPILLARY BLOOD GLUCOSE          Labs      .Urine Clean Catch (Midstream)   @ 23:55   <10,000 CFU/ml Normal Urogenital shameka present  --  --          Radiology Results      < from: US Duplex Carotid Arteries Complete, Bilateral (19 @ 10:52) >  IMPRESSION:  All peak systolic and end diastolic velocities are within   normal limits. No sonographic evidence of hemodynamically significant   stenosis.    < end of copied text >      Meds    MEDICATIONS  (STANDING):  apixaban 10 milliGRAM(s) Oral every 12 hours  benzonatate 100 milliGRAM(s) Oral three times a day  fluticasone propionate 50 MICROgram(s)/spray Nasal Spray 1 Spray(s) Both Nostrils two times a day  guaiFENesin    Syrup 100 milliGRAM(s) Oral every 6 hours  levothyroxine 75 MICROGram(s) Oral daily  oseltamivir 75 milliGRAM(s) Oral every 12 hours  pantoprazole    Tablet 40 milliGRAM(s) Oral before breakfast  senna 2 Tablet(s) Oral at bedtime  sodium chloride 0.65% Nasal 1 Spray(s) Both Nostrils two times a day      MEDICATIONS  (PRN):  acetaminophen   Tablet .. 650 milliGRAM(s) Oral every 6 hours PRN Moderate Pain (4 - 6)  ondansetron   Solution 4 milliGRAM(s) Oral every 8 hours PRN Nausea      Physical Exam      Neuro :  no focal deficits  Respiratory: CTA B/L  CV: RRR, S1S2, no murmurs,   Abdominal: Soft, NT, ND +BS,  Extremities: No edema, + peripheral pulses, right leg short leg cast    ASSESSMENT    Dizziness and giddiness possibly 2nd to vestibulitis 2nd to viral infxn  flu a  pe   h/o THORNE (nonalcoholic steatohepatitis)  Chronic constipation  Hemorrhoids  Diverticulosis  Gastritis  Hernia  Hypothyroid  Ankle injury  History of ankle surgery  H/O:   H/O hysterectomy with unilateral oophorectomy  No significant past surgical history      PLAN      cardio f/u noted  echo with EF = 55 to 60% noted above  US carotid neg for hemodynamically significant stenosis noted above.   neuro f/u  pt to f/u with outpt  neurologist, Dr. Willam Zimmer,   No head imaging recommendations now:  Based on available information, the benefit of starting anticoagulation to treat existing pulmonary emboli outweighs the risk of bleed from an aneurysm that is reportedly small in diameter and decreasing in size  If there is an acute change in neurological or mental status, consider urgent CT Head to evaluate for new hemorrhage  cont tariq flu  cont fluticasone nasal spray bid  cont tessalon perles tid for cough  add loratidine 10 mg daily  pulm f/u  VQ Scan shows high probability for PE noted above  cont lovenox  hem/onc eval noted  The small aneurysm should not post much risk for bleeding if she takes AC for 3 months.    There is no clear indication for ASA so  Will stop it.    Risk of AC is discussed with her and her daughter.  We can switch AC to eliquis or xarelto whichever is covered by pt's insurance.    she needs it for 3 months.  phys tx eval noted and rec home w/ home PT  cont current meds  d/c plan for am if cough is improved and pt stable

## 2019-02-02 VITALS
RESPIRATION RATE: 18 BRPM | OXYGEN SATURATION: 100 % | HEART RATE: 59 BPM | TEMPERATURE: 98 F | DIASTOLIC BLOOD PRESSURE: 64 MMHG | SYSTOLIC BLOOD PRESSURE: 127 MMHG

## 2019-02-02 LAB
ALBUMIN SERPL ELPH-MCNC: 3 G/DL — LOW (ref 3.5–5)
ALP SERPL-CCNC: 116 U/L — SIGNIFICANT CHANGE UP (ref 40–120)
ALT FLD-CCNC: 30 U/L DA — SIGNIFICANT CHANGE UP (ref 10–60)
ANION GAP SERPL CALC-SCNC: 9 MMOL/L — SIGNIFICANT CHANGE UP (ref 5–17)
AST SERPL-CCNC: 27 U/L — SIGNIFICANT CHANGE UP (ref 10–40)
BASOPHILS # BLD AUTO: 0.1 K/UL — SIGNIFICANT CHANGE UP (ref 0–0.2)
BASOPHILS NFR BLD AUTO: 0.9 % — SIGNIFICANT CHANGE UP (ref 0–2)
BILIRUB SERPL-MCNC: 0.3 MG/DL — SIGNIFICANT CHANGE UP (ref 0.2–1.2)
BUN SERPL-MCNC: 8 MG/DL — SIGNIFICANT CHANGE UP (ref 7–18)
CALCIUM SERPL-MCNC: 8.9 MG/DL — SIGNIFICANT CHANGE UP (ref 8.4–10.5)
CHLORIDE SERPL-SCNC: 100 MMOL/L — SIGNIFICANT CHANGE UP (ref 96–108)
CO2 SERPL-SCNC: 24 MMOL/L — SIGNIFICANT CHANGE UP (ref 22–31)
CREAT SERPL-MCNC: 0.6 MG/DL — SIGNIFICANT CHANGE UP (ref 0.5–1.3)
EOSINOPHIL # BLD AUTO: 0.4 K/UL — SIGNIFICANT CHANGE UP (ref 0–0.5)
EOSINOPHIL NFR BLD AUTO: 6.6 % — HIGH (ref 0–6)
GLUCOSE SERPL-MCNC: 83 MG/DL — SIGNIFICANT CHANGE UP (ref 70–99)
HCT VFR BLD CALC: 38 % — SIGNIFICANT CHANGE UP (ref 34.5–45)
HGB BLD-MCNC: 12.7 G/DL — SIGNIFICANT CHANGE UP (ref 11.5–15.5)
LYMPHOCYTES # BLD AUTO: 1.9 K/UL — SIGNIFICANT CHANGE UP (ref 1–3.3)
LYMPHOCYTES # BLD AUTO: 31.6 % — SIGNIFICANT CHANGE UP (ref 13–44)
MAGNESIUM SERPL-MCNC: 2.2 MG/DL — SIGNIFICANT CHANGE UP (ref 1.6–2.6)
MCHC RBC-ENTMCNC: 29.8 PG — SIGNIFICANT CHANGE UP (ref 27–34)
MCHC RBC-ENTMCNC: 33.4 GM/DL — SIGNIFICANT CHANGE UP (ref 32–36)
MCV RBC AUTO: 89.3 FL — SIGNIFICANT CHANGE UP (ref 80–100)
MONOCYTES # BLD AUTO: 0.6 K/UL — SIGNIFICANT CHANGE UP (ref 0–0.9)
MONOCYTES NFR BLD AUTO: 10.2 % — SIGNIFICANT CHANGE UP (ref 2–14)
NEUTROPHILS # BLD AUTO: 3.1 K/UL — SIGNIFICANT CHANGE UP (ref 1.8–7.4)
NEUTROPHILS NFR BLD AUTO: 50.6 % — SIGNIFICANT CHANGE UP (ref 43–77)
PHOSPHATE SERPL-MCNC: 3.6 MG/DL — SIGNIFICANT CHANGE UP (ref 2.5–4.5)
PLATELET # BLD AUTO: 318 K/UL — SIGNIFICANT CHANGE UP (ref 150–400)
POTASSIUM SERPL-MCNC: 3.8 MMOL/L — SIGNIFICANT CHANGE UP (ref 3.5–5.3)
POTASSIUM SERPL-SCNC: 3.8 MMOL/L — SIGNIFICANT CHANGE UP (ref 3.5–5.3)
PROT SERPL-MCNC: 6.2 G/DL — SIGNIFICANT CHANGE UP (ref 6–8.3)
RBC # BLD: 4.25 M/UL — SIGNIFICANT CHANGE UP (ref 3.8–5.2)
RBC # FLD: 11.9 % — SIGNIFICANT CHANGE UP (ref 10.3–14.5)
SODIUM SERPL-SCNC: 133 MMOL/L — LOW (ref 135–145)
WBC # BLD: 6.1 K/UL — SIGNIFICANT CHANGE UP (ref 3.8–10.5)
WBC # FLD AUTO: 6.1 K/UL — SIGNIFICANT CHANGE UP (ref 3.8–10.5)

## 2019-02-02 PROCEDURE — 83690 ASSAY OF LIPASE: CPT

## 2019-02-02 PROCEDURE — 87631 RESP VIRUS 3-5 TARGETS: CPT

## 2019-02-02 PROCEDURE — 80061 LIPID PANEL: CPT

## 2019-02-02 PROCEDURE — 84100 ASSAY OF PHOSPHORUS: CPT

## 2019-02-02 PROCEDURE — 82607 VITAMIN B-12: CPT

## 2019-02-02 PROCEDURE — 87086 URINE CULTURE/COLONY COUNT: CPT

## 2019-02-02 PROCEDURE — 36415 COLL VENOUS BLD VENIPUNCTURE: CPT

## 2019-02-02 PROCEDURE — 78582 LUNG VENTILAT&PERFUS IMAGING: CPT

## 2019-02-02 PROCEDURE — 93005 ELECTROCARDIOGRAM TRACING: CPT

## 2019-02-02 PROCEDURE — 84443 ASSAY THYROID STIM HORMONE: CPT

## 2019-02-02 PROCEDURE — 84484 ASSAY OF TROPONIN QUANT: CPT

## 2019-02-02 PROCEDURE — 93306 TTE W/DOPPLER COMPLETE: CPT

## 2019-02-02 PROCEDURE — 83036 HEMOGLOBIN GLYCOSYLATED A1C: CPT

## 2019-02-02 PROCEDURE — 71045 X-RAY EXAM CHEST 1 VIEW: CPT

## 2019-02-02 PROCEDURE — 96374 THER/PROPH/DIAG INJ IV PUSH: CPT

## 2019-02-02 PROCEDURE — 96361 HYDRATE IV INFUSION ADD-ON: CPT

## 2019-02-02 PROCEDURE — 80048 BASIC METABOLIC PNL TOTAL CA: CPT

## 2019-02-02 PROCEDURE — 93880 EXTRACRANIAL BILAT STUDY: CPT

## 2019-02-02 PROCEDURE — 82306 VITAMIN D 25 HYDROXY: CPT

## 2019-02-02 PROCEDURE — 81003 URINALYSIS AUTO W/O SCOPE: CPT

## 2019-02-02 PROCEDURE — 85379 FIBRIN DEGRADATION QUANT: CPT

## 2019-02-02 PROCEDURE — 97530 THERAPEUTIC ACTIVITIES: CPT

## 2019-02-02 PROCEDURE — 86803 HEPATITIS C AB TEST: CPT

## 2019-02-02 PROCEDURE — 85027 COMPLETE CBC AUTOMATED: CPT

## 2019-02-02 PROCEDURE — 99285 EMERGENCY DEPT VISIT HI MDM: CPT | Mod: 25

## 2019-02-02 PROCEDURE — 80053 COMPREHEN METABOLIC PANEL: CPT

## 2019-02-02 PROCEDURE — 93971 EXTREMITY STUDY: CPT

## 2019-02-02 PROCEDURE — 97161 PT EVAL LOW COMPLEX 20 MIN: CPT

## 2019-02-02 PROCEDURE — 83735 ASSAY OF MAGNESIUM: CPT

## 2019-02-02 PROCEDURE — 94640 AIRWAY INHALATION TREATMENT: CPT

## 2019-02-02 PROCEDURE — 82962 GLUCOSE BLOOD TEST: CPT

## 2019-02-02 RX ORDER — ONDANSETRON 8 MG/1
4 TABLET, FILM COATED ORAL EVERY 8 HOURS
Qty: 0 | Refills: 0 | Status: DISCONTINUED | OUTPATIENT
Start: 2019-02-02 | End: 2019-02-02

## 2019-02-02 RX ADMIN — Medication 1 SPRAY(S): at 05:43

## 2019-02-02 RX ADMIN — APIXABAN 10 MILLIGRAM(S): 2.5 TABLET, FILM COATED ORAL at 05:43

## 2019-02-02 RX ADMIN — Medication 650 MILLIGRAM(S): at 06:30

## 2019-02-02 RX ADMIN — Medication 100 MILLIGRAM(S): at 05:42

## 2019-02-02 RX ADMIN — Medication 75 MICROGRAM(S): at 05:42

## 2019-02-02 RX ADMIN — Medication 650 MILLIGRAM(S): at 05:42

## 2019-02-02 RX ADMIN — Medication 1 SPRAY(S): at 05:42

## 2019-02-02 RX ADMIN — PANTOPRAZOLE SODIUM 40 MILLIGRAM(S): 20 TABLET, DELAYED RELEASE ORAL at 05:47

## 2019-02-02 RX ADMIN — Medication 75 MILLIGRAM(S): at 05:42

## 2019-02-02 NOTE — PROGRESS NOTE ADULT - PROBLEM SELECTOR PLAN 2
+ for Influenza A.   Droplet Precautions  Tamiflu x 5 days.  O2 Supplements.
+ for Influenza A.   Droplet Precautions  Tamiflu x 5 days.  O2 Supplements.
Rt foot in cast , NWB. Pt has hardware from 12/20 and cannot have MRI
+ for Influenza A.   Droplet Precautions  Tamiflu x 5 days.  O2 Supplements.
- c/w Tamiflu Day # 4 and supportive care
- c/w Tamiflu Day # 3 and supportive care

## 2019-02-02 NOTE — PROGRESS NOTE ADULT - REASON FOR ADMISSION
near syncope, cough, SOB

## 2019-02-02 NOTE — PROGRESS NOTE ADULT - SUBJECTIVE AND OBJECTIVE BOX
Patient is a 68y old  Female who presents with a chief complaint of near syncope, cough, SOB (02 Feb 2019 08:32)  awake, alert, comfortable in bed in NAD. Still with cough. Feels better. Stable for DC home with med/pulm follow up    INTERVAL HPI/OVERNIGHT EVENTS:      VITAL SIGNS:  T(F): 98.5 (02-02-19 @ 08:01)  HR: 59 (02-02-19 @ 08:01)  BP: 127/64 (02-02-19 @ 08:01)  RR: 18 (02-02-19 @ 08:01)  SpO2: 100% (02-02-19 @ 08:01)  Wt(kg): --  I&O's Detail          REVIEW OF SYSTEMS:    CONSTITUTIONAL:  No fevers, chills, sweats    HEENT:  Eyes:  No diplopia or blurred vision. ENT:  No earache, sore throat or runny nose.    CARDIOVASCULAR:  No pressure, squeezing, tightness, or heaviness about the chest; no palpitations.    RESPIRATORY:  Per HPI    GASTROINTESTINAL:  No abdominal pain, nausea, vomiting or diarrhea.    GENITOURINARY:  No dysuria, frequency or urgency.    NEUROLOGIC:  No paresthesias, fasciculations, seizures or weakness.    PSYCHIATRIC:  No disorder of thought or mood.      PHYSICAL EXAM:    Constitutional: Well developed and nourished  Eyes:Perrla  ENMT: normal  Neck:supple  Respiratory: good air entry  Cardiovascular: S1 S2 regular  Gastrointestinal: Soft, Non tender  Extremities: No edema  Vascular:normal  Neurological:Awake, alert,Ox3  Musculoskeletal:Normal      MEDICATIONS  (STANDING):  apixaban 10 milliGRAM(s) Oral every 12 hours  benzonatate 100 milliGRAM(s) Oral three times a day  fluticasone propionate 50 MICROgram(s)/spray Nasal Spray 1 Spray(s) Both Nostrils two times a day  guaiFENesin    Syrup 100 milliGRAM(s) Oral every 6 hours  levothyroxine 75 MICROGram(s) Oral daily  loratadine 10 milliGRAM(s) Oral daily  oseltamivir 75 milliGRAM(s) Oral every 12 hours  pantoprazole    Tablet 40 milliGRAM(s) Oral before breakfast  senna 2 Tablet(s) Oral at bedtime  sodium chloride 0.65% Nasal 1 Spray(s) Both Nostrils two times a day    MEDICATIONS  (PRN):  acetaminophen   Tablet .. 650 milliGRAM(s) Oral every 6 hours PRN Moderate Pain (4 - 6)  ondansetron    Tablet 4 milliGRAM(s) Oral every 8 hours PRN Nausea      Allergies    iodine (Angioedema)  penicillin (Rash)    Intolerances        LABS:                        12.7   6.1   )-----------( 318      ( 02 Feb 2019 06:31 )             38.0     02-02    133<L>  |  100  |  8   ----------------------------<  83  3.8   |  24  |  0.60    Ca    8.9      02 Feb 2019 06:31  Phos  3.6     02-02  Mg     2.2     02-02    TPro  6.2  /  Alb  3.0<L>  /  TBili  0.3  /  DBili  x   /  AST  27  /  ALT  30  /  AlkPhos  116  02-02              CAPILLARY BLOOD GLUCOSE        pro-bnp -- 01-28 @ 17:17     d-dimer 335  01-28 @ 17:17      RADIOLOGY & ADDITIONAL TESTS:    CXR:    Ct scan chest:    ekg;    echo:

## 2019-02-02 NOTE — PROGRESS NOTE ADULT - SUBJECTIVE AND OBJECTIVE BOX
Patient is a 68y old  Female who presents with a chief complaint of near syncope, cough, SOB (2019 17:11)    pt seen in ed tele [  ], reg med floor [  x ], bed [x ], chair at bedside [   ], a+o x3 [ x ], lethargic [  ],  nad [ x     pt states cough is improving      Allergies    iodine (Angioedema)  penicillin (Rash)        Vitals    T(F): 98.5 (19 @ 08:01), Max: 98.5 (19 @ 08:01)  HR: 59 (19 @ 08:01) (59 - 66)  BP: 127/64 (19 @ 08:01) (112/49 - 127/64)  RR: 18 (19 @ 08:01) (16 - 18)  SpO2: 100% (19 @ 08:01) (98% - 100%)  Wt(kg): --  CAPILLARY BLOOD GLUCOSE          Labs                          12.7   6.1   )-----------( 318      ( 2019 06:31 )             38.0       02-    133<L>  |  100  |  8   ----------------------------<  83  3.8   |  24  |  0.60    Ca    8.9      2019 06:31  Phos  3.6     02-  Mg     2.2     02-    TPro  6.2  /  Alb  3.0<L>  /  TBili  0.3  /  DBili  x   /  AST  27  /  ALT  30  /  AlkPhos  116  02-02            .Urine Clean Catch (Midstream)   @ 23:55   <10,000 CFU/ml Normal Urogenital shameka present  --  --          Radiology Results      Meds    MEDICATIONS  (STANDING):  apixaban 10 milliGRAM(s) Oral every 12 hours  benzonatate 100 milliGRAM(s) Oral three times a day  fluticasone propionate 50 MICROgram(s)/spray Nasal Spray 1 Spray(s) Both Nostrils two times a day  guaiFENesin    Syrup 100 milliGRAM(s) Oral every 6 hours  levothyroxine 75 MICROGram(s) Oral daily  loratadine 10 milliGRAM(s) Oral daily  oseltamivir 75 milliGRAM(s) Oral every 12 hours  pantoprazole    Tablet 40 milliGRAM(s) Oral before breakfast  senna 2 Tablet(s) Oral at bedtime  sodium chloride 0.65% Nasal 1 Spray(s) Both Nostrils two times a day      MEDICATIONS  (PRN):  acetaminophen   Tablet .. 650 milliGRAM(s) Oral every 6 hours PRN Moderate Pain (4 - 6)  ondansetron   Solution 4 milliGRAM(s) Oral every 8 hours PRN Nausea      Physical Exam    Neuro :  no focal deficits  Respiratory: CTA B/L  CV: RRR, S1S2, no murmurs,   Abdominal: Soft, NT, ND +BS,  Extremities: No edema, + peripheral pulses, right leg short leg cast    ASSESSMENT    Dizziness and giddiness possibly 2nd to vestibulitis 2nd to viral infxn  flu a  pe   h/o THORNE (nonalcoholic steatohepatitis)  Chronic constipation  Hemorrhoids  Diverticulosis  Gastritis  Hernia  Hypothyroid  Ankle injury  History of ankle surgery  H/O:   H/O hysterectomy with unilateral oophorectomy  No significant past surgical history      PLAN      cardio f/u noted  echo with EF = 55 to 60% noted above  US carotid neg for hemodynamically significant stenosis noted above.   neuro f/u  pt to f/u with outpt  neurologist, Dr. Willam Zimmer,   No head imaging recommendations now:  Based on available information, the benefit of starting anticoagulation to treat existing pulmonary emboli outweighs the risk of bleed from an aneurysm that is reportedly small in diameter and decreasing in size  If there is an acute change in neurological or mental status, consider urgent CT Head to evaluate for new hemorrhage  cont tariq flu  cont fluticasone nasal spray bid  cont tessalon perles tid for cough  cont loratidine 10 mg daily  pulm f/u  VQ Scan shows high probability for PE noted above  cont lovenox  hem/onc eval noted  The small aneurysm should not post much risk for bleeding if she takes AC for 3 months.    There is no clear indication for ASA so  Will stop it.    Risk of AC is discussed with her and her daughter.  cont eliquis     she needs it for 3 months.  phys tx eval noted and rec home w/ home PT  cont current meds  pt stable for d/c

## 2019-02-02 NOTE — PROGRESS NOTE ADULT - PROBLEM SELECTOR PLAN 1
VQ Scan shows high probability for PE.   US carotid noted.   Echo noted.   Anticoagulation  hem/onc eval.  Tele monitor.  O2 Supplement.
VQ Scan shows high probability for PE.   US carotid noted.   Echo noted.   Anticoagulation  hem/onc eval.  CT Chest.  Tele monitor.  O2 Supplement.
Will start TMT dose of Lovenox as per Dr Lucas 90 mg q 12
VQ Scan shows high probability for PE.   US carotid noted.   Echo noted.   Anticoagulation  hem/onc eval.  CT Chest pending.   Tele monitor.  O2 Supplement.
- pt p/w sob, cough and dizziness. D dimer 335. Unable to go CTA chest dur to contrast allergy and patient is refused pre medication with steroids. VQ scan done and showed high probability for PE.   - Likely provoked in setting of recent rt ankle injury  - Hem/onc consult appreciated, c/w  Eliquis (approved by insurance )
- pt p/w sob, cough and dizziness. D dimer 335. Unable to go CTA chest dur to contrast allergy and patient is refused pre medication with steroids. VQ scan done and showed high probability for PE.   - On full dose Lovenox  - Likely provoked in setting of recent rt ankle injury  - Hem/onc consult appreciated, will switch to Eliquis if approved by insurance.

## 2019-02-02 NOTE — PROGRESS NOTE ADULT - PROVIDER SPECIALTY LIST ADULT
Cardiology
Internal Medicine
Pulmonology
Pulmonology
Internal Medicine
Internal Medicine
Pulmonology

## 2019-02-02 NOTE — PROGRESS NOTE ADULT - PROBLEM SELECTOR PLAN 7
patient is anticoagulated.
IMPROVE VTE Individual Risk Assessment          RISK                                                          Points  [  ] Previous VTE                                                3  [  ] Thrombophilia                                             2  [  ] Lower limb paralysis                                   2        (unable to hold up >15 seconds)    [  ] Current Cancer                                             2         (within 6 months)  [ x ] Immobilization > 24 hrs                              1  [  ] ICU/CCU stay > 24 hours                             1  [ x ] Age > 60                                                         1    IMPROVE VTE Score: 2  On full dose Lovenox
patient is anticoagulated.
patient is anticoagulated.
IMPROVE VTE Individual Risk Assessment          RISK                                                          Points  [  ] Previous VTE                                                3  [  ] Thrombophilia                                             2  [  ] Lower limb paralysis                                   2        (unable to hold up >15 seconds)    [  ] Current Cancer                                             2         (within 6 months)  [ x ] Immobilization > 24 hrs                              1  [  ] ICU/CCU stay > 24 hours                             1  [ x ] Age > 60                                                         1    IMPROVE VTE Score: 2  On full dose Lovenox

## 2019-02-02 NOTE — PROGRESS NOTE ADULT - PROBLEM SELECTOR PLAN 4
RLE elevation while in bed, NWB  Immobilizer in tact.  f/u physical therapy  Ortho follow up as OP
RLE elevation while in bed, NWB  Immobilizer in tact.  f/u physical therapy consult.
Pt has multifactorial reasons for lightheadedness. Continue Tamiflu . Carotid dopplers awaiting results. SB on telemetry.  Pt may need CT scan after neuro evaluation. Protocal for pre treatment for iodine allergy placed on chart
RLE elevation while in bed, NWB  Immobilizer in tact.  f/u physical therapy consult.
- s/p surgery  - RLE elevation while in bed, NWB  - PT recommended home with home PT
- s/p surgery  - RLE elevation while in bed, NWB  - PT recommended home with home PT

## 2019-02-02 NOTE — PROGRESS NOTE ADULT - PROBLEM SELECTOR PROBLEM 1
Pulmonary embolism
Pulmonary embolism
Suspected pulmonary embolism
Pulmonary embolism

## 2019-03-11 ENCOUNTER — EMERGENCY (EMERGENCY)
Facility: HOSPITAL | Age: 69
LOS: 1 days | Discharge: HOME | End: 2019-03-11
Attending: EMERGENCY MEDICINE
Payer: MEDICARE

## 2019-03-11 VITALS
DIASTOLIC BLOOD PRESSURE: 70 MMHG | OXYGEN SATURATION: 98 % | RESPIRATION RATE: 20 BRPM | HEART RATE: 62 BPM | TEMPERATURE: 97 F | SYSTOLIC BLOOD PRESSURE: 110 MMHG

## 2019-03-11 VITALS
TEMPERATURE: 97 F | DIASTOLIC BLOOD PRESSURE: 84 MMHG | WEIGHT: 199.96 LBS | HEIGHT: 61 IN | SYSTOLIC BLOOD PRESSURE: 140 MMHG | HEART RATE: 66 BPM | RESPIRATION RATE: 16 BRPM | OXYGEN SATURATION: 97 %

## 2019-03-11 DIAGNOSIS — Z98.890 OTHER SPECIFIED POSTPROCEDURAL STATES: Chronic | ICD-10-CM

## 2019-03-11 DIAGNOSIS — Z98.891 HISTORY OF UTERINE SCAR FROM PREVIOUS SURGERY: Chronic | ICD-10-CM

## 2019-03-11 DIAGNOSIS — Z90.710 ACQUIRED ABSENCE OF BOTH CERVIX AND UTERUS: Chronic | ICD-10-CM

## 2019-03-11 PROCEDURE — 99284 EMERGENCY DEPT VISIT MOD MDM: CPT

## 2019-03-11 PROCEDURE — 93971 EXTREMITY STUDY: CPT

## 2019-03-11 PROCEDURE — 93971 EXTREMITY STUDY: CPT | Mod: 26,RT

## 2019-03-11 PROCEDURE — 99284 EMERGENCY DEPT VISIT MOD MDM: CPT | Mod: 25

## 2019-03-11 NOTE — ED PROVIDER NOTE - CHPI ED SYMPTOMS NEG
no tingling/no weakness/no fever/no numbness/no chest pain, no shortness of breath, no palpitations, no dizziness, no syncope

## 2019-03-11 NOTE — ED PROVIDER NOTE - CLINICAL SUMMARY MEDICAL DECISION MAKING FREE TEXT BOX
67 y/o F with a PMHx of PE and a PSHx of R ankle surgery presents to the ED with R ankle swelling and posterior calf discomfort x a few days. Pt has increased swelling and mild tenderness to calf. Pt is neurovascularly intact. Infection unlikely and low suspicion of DVT. Will check US and likely d/c home.

## 2019-03-11 NOTE — ED ADULT NURSE NOTE - PSH
H/O hysterectomy with unilateral oophorectomy    H/O:     History of ankle surgery  bilateral ankle surgery, 1 month ago had RLE ankle surgery

## 2019-03-11 NOTE — ED ADULT NURSE NOTE - PMH
Ankle injury  right ankle  Chronic constipation    Diverticulosis    Gastritis    Hemorrhoids    Hernia    Hypothyroid    THORNE (nonalcoholic steatohepatitis)  stage 2

## 2019-03-11 NOTE — ED ADULT NURSE NOTE - NSIMPLEMENTINTERV_GEN_ALL_ED
Implemented All Universal Safety Interventions:  [place] to call system. Call bell, personal items and telephone within reach. Instruct patient to call for assistance. Room bathroom lighting operational. Non-slip footwear when patient is off stretcher. Physically safe environment: no spills, clutter or unnecessary equipment. Stretcher in lowest position, wheels locked, appropriate side rails in place.

## 2019-03-11 NOTE — ED PROVIDER NOTE - OBJECTIVE STATEMENT
69 y/o F with a significant PMHx of PE x 1 month ago on Eliquis and a significant PSHx of R ankle surgery presents to the ED with c/o R ankle swelling and posterior calf discomfort x a few days. Pt states she had a recent ankle surgery and started walking on the ankle again, but since then has been having more ankle swelling that used to be improved when she elevated it but is now persisting. Pt notes she is not sure if the calf discomfort as a new or old pain. Pt reports that the dressing to the lateral aspect of the R ankle is not been draining and is not more painful, red, or hot. Pt denies fever, chills, numbness, tingling focal weakness, chest pain, SOB, dizziness, palpitations, syncope, or any other complaints. NKDA.

## 2019-03-11 NOTE — ED PROVIDER NOTE - PHYSICAL EXAMINATION
R ankle: lateral aspect with wound 1 cm diameter with abx cream applied; no surrounding erythema, no induration, no tenderness, no drainage; full ROM of ankle with wound; mild non-pitting edema to wound; no swelling, no redness, no erythema to R calf; mild calf tenderness

## 2019-03-11 NOTE — ED PROVIDER NOTE - ATTENDING CONTRIBUTION TO CARE
69 y/o with leg swelling VSS , no sob , no chest pain   right ankle and posterior calf  1 cm lateral calf ulcer no surrounding erythema , o induration  doppler

## 2019-03-11 NOTE — ED PROVIDER NOTE - CROS ED ROS STATEMENT
22y  w/ LMP 18 presents from home after having ultrasound performed today with fetal pole with no cardiac activity, measuring 6w1d, likely missed , in stable condition. all other ROS negative except as per HPI

## 2019-03-11 NOTE — ED PROVIDER NOTE - PMH
Ankle injury  right ankle  Chronic constipation    Diverticulosis    Gastritis    Hemorrhoids    Hernia    Hypothyroid    THORNE (nonalcoholic steatohepatitis)  stage 2
8 mo male with intractable seizures who has been having seizures about every 10 minutes for the past few day and now having desaturations,  mom gave ativan through g tube 2 days ago without any response.  No fevers, no vomiting.  Physical exam; active seizure on arrival with turning head to left side, stiffening, lungs clear, cardiac exam wnl, abdomen very soft nd nt no hsm no masses, cap refill less than 2 seconds  Impression: 8 mo male with intractable seizures with seizures every 10 minutes, given dose of IV Ativan, neurology consulted  Divine Rashid MD

## 2019-03-11 NOTE — ED PROVIDER NOTE - PROGRESS NOTE DETAILS
US negative. Swelling likely related to increased walking after surgery. No signs of infection. Will dc with instructions to elevate leg and PMD follow up in 2-3 days. Pt is well appearing walking with steady gait, stable for discharge and follow up without fail with medical doctor. I had a detailed discussion with the patient and/or guardian regarding the historical points, exam findings, and any diagnostic results supporting the discharge diagnosis. Pt educated on care and need for follow up. Strict return instructions and red flag signs and symptoms discussed with patient. Questions answered. Pt shows understanding of discharge information and agrees to follow.

## 2019-03-12 PROBLEM — K75.81 NONALCOHOLIC STEATOHEPATITIS (NASH): Chronic | Status: ACTIVE | Noted: 2019-01-29

## 2019-03-12 PROBLEM — K59.09 OTHER CONSTIPATION: Chronic | Status: ACTIVE | Noted: 2019-01-29

## 2019-03-12 PROBLEM — S99.919A UNSPECIFIED INJURY OF UNSPECIFIED ANKLE, INITIAL ENCOUNTER: Chronic | Status: ACTIVE | Noted: 2019-01-28

## 2019-03-12 PROBLEM — K57.90 DIVERTICULOSIS OF INTESTINE, PART UNSPECIFIED, WITHOUT PERFORATION OR ABSCESS WITHOUT BLEEDING: Chronic | Status: ACTIVE | Noted: 2019-01-29

## 2019-03-12 PROBLEM — K29.70 GASTRITIS, UNSPECIFIED, WITHOUT BLEEDING: Chronic | Status: ACTIVE | Noted: 2019-01-29

## 2019-03-12 PROBLEM — E03.9 HYPOTHYROIDISM, UNSPECIFIED: Chronic | Status: ACTIVE | Noted: 2019-01-29

## 2019-03-12 PROBLEM — K64.9 UNSPECIFIED HEMORRHOIDS: Chronic | Status: ACTIVE | Noted: 2019-01-29

## 2019-03-12 PROBLEM — K46.9 UNSPECIFIED ABDOMINAL HERNIA WITHOUT OBSTRUCTION OR GANGRENE: Chronic | Status: ACTIVE | Noted: 2019-01-29

## 2019-05-06 ENCOUNTER — APPOINTMENT (OUTPATIENT)
Dept: OPHTHALMOLOGY | Facility: CLINIC | Age: 69
End: 2019-05-06

## 2019-05-07 ENCOUNTER — EMERGENCY (EMERGENCY)
Facility: HOSPITAL | Age: 69
LOS: 1 days | Discharge: HOME | End: 2019-05-07
Attending: EMERGENCY MEDICINE
Payer: MEDICARE

## 2019-05-07 VITALS
SYSTOLIC BLOOD PRESSURE: 129 MMHG | DIASTOLIC BLOOD PRESSURE: 77 MMHG | RESPIRATION RATE: 18 BRPM | HEART RATE: 63 BPM | OXYGEN SATURATION: 100 % | TEMPERATURE: 99 F

## 2019-05-07 VITALS
SYSTOLIC BLOOD PRESSURE: 153 MMHG | HEIGHT: 61 IN | TEMPERATURE: 98 F | HEART RATE: 71 BPM | WEIGHT: 188.94 LBS | DIASTOLIC BLOOD PRESSURE: 100 MMHG | RESPIRATION RATE: 17 BRPM | OXYGEN SATURATION: 97 %

## 2019-05-07 DIAGNOSIS — Z98.891 HISTORY OF UTERINE SCAR FROM PREVIOUS SURGERY: Chronic | ICD-10-CM

## 2019-05-07 DIAGNOSIS — Z90.710 ACQUIRED ABSENCE OF BOTH CERVIX AND UTERUS: Chronic | ICD-10-CM

## 2019-05-07 DIAGNOSIS — Z98.890 OTHER SPECIFIED POSTPROCEDURAL STATES: Chronic | ICD-10-CM

## 2019-05-07 LAB
ACETONE SERPL-MCNC: NEGATIVE — SIGNIFICANT CHANGE UP
ALBUMIN SERPL ELPH-MCNC: 3.6 G/DL — SIGNIFICANT CHANGE UP (ref 3.5–5)
ALP SERPL-CCNC: 178 U/L — HIGH (ref 40–120)
ALT FLD-CCNC: 20 U/L DA — SIGNIFICANT CHANGE UP (ref 10–60)
ANION GAP SERPL CALC-SCNC: 8 MMOL/L — SIGNIFICANT CHANGE UP (ref 5–17)
APPEARANCE UR: CLEAR — SIGNIFICANT CHANGE UP
AST SERPL-CCNC: 33 U/L — SIGNIFICANT CHANGE UP (ref 10–40)
BACTERIA # UR AUTO: ABNORMAL /HPF
BASOPHILS # BLD AUTO: 0.05 K/UL — SIGNIFICANT CHANGE UP (ref 0–0.2)
BASOPHILS NFR BLD AUTO: 0.4 % — SIGNIFICANT CHANGE UP (ref 0–2)
BILIRUB SERPL-MCNC: 0.5 MG/DL — SIGNIFICANT CHANGE UP (ref 0.2–1.2)
BILIRUB UR-MCNC: NEGATIVE — SIGNIFICANT CHANGE UP
BUN SERPL-MCNC: 12 MG/DL — SIGNIFICANT CHANGE UP (ref 7–18)
CALCIUM SERPL-MCNC: 8.8 MG/DL — SIGNIFICANT CHANGE UP (ref 8.4–10.5)
CHLORIDE SERPL-SCNC: 103 MMOL/L — SIGNIFICANT CHANGE UP (ref 96–108)
CO2 SERPL-SCNC: 23 MMOL/L — SIGNIFICANT CHANGE UP (ref 22–31)
COLOR SPEC: SIGNIFICANT CHANGE UP
CREAT SERPL-MCNC: 0.75 MG/DL — SIGNIFICANT CHANGE UP (ref 0.5–1.3)
DIFF PNL FLD: ABNORMAL
EOSINOPHIL # BLD AUTO: 0.3 K/UL — SIGNIFICANT CHANGE UP (ref 0–0.5)
EOSINOPHIL NFR BLD AUTO: 2.6 % — SIGNIFICANT CHANGE UP (ref 0–6)
EPI CELLS # UR: ABNORMAL /HPF
GLUCOSE SERPL-MCNC: 104 MG/DL — HIGH (ref 70–99)
GLUCOSE UR QL: NEGATIVE — SIGNIFICANT CHANGE UP
HCT VFR BLD CALC: 38.8 % — SIGNIFICANT CHANGE UP (ref 34.5–45)
HGB BLD-MCNC: 12.8 G/DL — SIGNIFICANT CHANGE UP (ref 11.5–15.5)
IMM GRANULOCYTES NFR BLD AUTO: 0.4 % — SIGNIFICANT CHANGE UP (ref 0–1.5)
KETONES UR-MCNC: NEGATIVE — SIGNIFICANT CHANGE UP
LEUKOCYTE ESTERASE UR-ACNC: NEGATIVE — SIGNIFICANT CHANGE UP
LIDOCAIN IGE QN: 103 U/L — SIGNIFICANT CHANGE UP (ref 73–393)
LYMPHOCYTES # BLD AUTO: 1.92 K/UL — SIGNIFICANT CHANGE UP (ref 1–3.3)
LYMPHOCYTES # BLD AUTO: 16.8 % — SIGNIFICANT CHANGE UP (ref 13–44)
MAGNESIUM SERPL-MCNC: 2.4 MG/DL — SIGNIFICANT CHANGE UP (ref 1.6–2.6)
MCHC RBC-ENTMCNC: 30.2 PG — SIGNIFICANT CHANGE UP (ref 27–34)
MCHC RBC-ENTMCNC: 33 GM/DL — SIGNIFICANT CHANGE UP (ref 32–36)
MCV RBC AUTO: 91.5 FL — SIGNIFICANT CHANGE UP (ref 80–100)
MONOCYTES # BLD AUTO: 0.75 K/UL — SIGNIFICANT CHANGE UP (ref 0–0.9)
MONOCYTES NFR BLD AUTO: 6.6 % — SIGNIFICANT CHANGE UP (ref 2–14)
NEUTROPHILS # BLD AUTO: 8.37 K/UL — HIGH (ref 1.8–7.4)
NEUTROPHILS NFR BLD AUTO: 73.2 % — SIGNIFICANT CHANGE UP (ref 43–77)
NITRITE UR-MCNC: NEGATIVE — SIGNIFICANT CHANGE UP
NRBC # BLD: 0 /100 WBCS — SIGNIFICANT CHANGE UP (ref 0–0)
NT-PROBNP SERPL-SCNC: 155 PG/ML — HIGH (ref 0–125)
PH UR: 7 — SIGNIFICANT CHANGE UP (ref 5–8)
PLATELET # BLD AUTO: 263 K/UL — SIGNIFICANT CHANGE UP (ref 150–400)
POTASSIUM SERPL-MCNC: 5.1 MMOL/L — SIGNIFICANT CHANGE UP (ref 3.5–5.3)
POTASSIUM SERPL-SCNC: 5.1 MMOL/L — SIGNIFICANT CHANGE UP (ref 3.5–5.3)
PROT SERPL-MCNC: 7.7 G/DL — SIGNIFICANT CHANGE UP (ref 6–8.3)
PROT UR-MCNC: NEGATIVE — SIGNIFICANT CHANGE UP
RBC # BLD: 4.24 M/UL — SIGNIFICANT CHANGE UP (ref 3.8–5.2)
RBC # FLD: 13.2 % — SIGNIFICANT CHANGE UP (ref 10.3–14.5)
RBC CASTS # UR COMP ASSIST: SIGNIFICANT CHANGE UP /HPF (ref 0–2)
SODIUM SERPL-SCNC: 134 MMOL/L — LOW (ref 135–145)
SP GR SPEC: 1.01 — SIGNIFICANT CHANGE UP (ref 1.01–1.02)
UROBILINOGEN FLD QL: NEGATIVE — SIGNIFICANT CHANGE UP
WBC # BLD: 11.44 K/UL — HIGH (ref 3.8–10.5)
WBC # FLD AUTO: 11.44 K/UL — HIGH (ref 3.8–10.5)
WBC UR QL: SIGNIFICANT CHANGE UP /HPF (ref 0–5)

## 2019-05-07 PROCEDURE — 81001 URINALYSIS AUTO W/SCOPE: CPT

## 2019-05-07 PROCEDURE — 80053 COMPREHEN METABOLIC PANEL: CPT

## 2019-05-07 PROCEDURE — 71045 X-RAY EXAM CHEST 1 VIEW: CPT

## 2019-05-07 PROCEDURE — 83735 ASSAY OF MAGNESIUM: CPT

## 2019-05-07 PROCEDURE — 87086 URINE CULTURE/COLONY COUNT: CPT

## 2019-05-07 PROCEDURE — 36415 COLL VENOUS BLD VENIPUNCTURE: CPT

## 2019-05-07 PROCEDURE — 82009 KETONE BODYS QUAL: CPT

## 2019-05-07 PROCEDURE — 83880 ASSAY OF NATRIURETIC PEPTIDE: CPT

## 2019-05-07 PROCEDURE — 71045 X-RAY EXAM CHEST 1 VIEW: CPT | Mod: 26

## 2019-05-07 PROCEDURE — 74176 CT ABD & PELVIS W/O CONTRAST: CPT

## 2019-05-07 PROCEDURE — 74176 CT ABD & PELVIS W/O CONTRAST: CPT | Mod: 26

## 2019-05-07 PROCEDURE — 99284 EMERGENCY DEPT VISIT MOD MDM: CPT | Mod: 25

## 2019-05-07 PROCEDURE — 83690 ASSAY OF LIPASE: CPT

## 2019-05-07 PROCEDURE — 99285 EMERGENCY DEPT VISIT HI MDM: CPT | Mod: 25

## 2019-05-07 PROCEDURE — 85027 COMPLETE CBC AUTOMATED: CPT

## 2019-05-07 RX ORDER — CIPROFLOXACIN LACTATE 400MG/40ML
500 VIAL (ML) INTRAVENOUS ONCE
Qty: 0 | Refills: 0 | Status: COMPLETED | OUTPATIENT
Start: 2019-05-07 | End: 2019-05-07

## 2019-05-07 RX ORDER — METRONIDAZOLE 500 MG
500 TABLET ORAL ONCE
Qty: 0 | Refills: 0 | Status: COMPLETED | OUTPATIENT
Start: 2019-05-07 | End: 2019-05-07

## 2019-05-07 RX ORDER — ACETAMINOPHEN 500 MG
650 TABLET ORAL ONCE
Qty: 0 | Refills: 0 | Status: COMPLETED | OUTPATIENT
Start: 2019-05-07 | End: 2019-05-07

## 2019-05-07 RX ORDER — SODIUM CHLORIDE 9 MG/ML
3 INJECTION INTRAMUSCULAR; INTRAVENOUS; SUBCUTANEOUS ONCE
Qty: 0 | Refills: 0 | Status: COMPLETED | OUTPATIENT
Start: 2019-05-07 | End: 2019-05-07

## 2019-05-07 RX ORDER — METRONIDAZOLE 500 MG
1 TABLET ORAL
Qty: 30 | Refills: 0 | OUTPATIENT
Start: 2019-05-07 | End: 2019-05-16

## 2019-05-07 RX ORDER — SODIUM CHLORIDE 9 MG/ML
1000 INJECTION INTRAMUSCULAR; INTRAVENOUS; SUBCUTANEOUS
Qty: 0 | Refills: 0 | Status: DISCONTINUED | OUTPATIENT
Start: 2019-05-07 | End: 2019-05-11

## 2019-05-07 RX ORDER — ACETAMINOPHEN 500 MG
1000 TABLET ORAL ONCE
Qty: 0 | Refills: 0 | Status: DISCONTINUED | OUTPATIENT
Start: 2019-05-07 | End: 2019-05-07

## 2019-05-07 RX ORDER — MOXIFLOXACIN HYDROCHLORIDE TABLETS, 400 MG 400 MG/1
1 TABLET, FILM COATED ORAL
Qty: 20 | Refills: 0 | OUTPATIENT
Start: 2019-05-07 | End: 2019-05-16

## 2019-05-07 RX ORDER — IOHEXOL 300 MG/ML
30 INJECTION, SOLUTION INTRAVENOUS ONCE
Qty: 0 | Refills: 0 | Status: COMPLETED | OUTPATIENT
Start: 2019-05-07 | End: 2019-05-07

## 2019-05-07 RX ADMIN — SODIUM CHLORIDE 3 MILLILITER(S): 9 INJECTION INTRAMUSCULAR; INTRAVENOUS; SUBCUTANEOUS at 19:00

## 2019-05-07 RX ADMIN — IOHEXOL 30 MILLILITER(S): 300 INJECTION, SOLUTION INTRAVENOUS at 19:52

## 2019-05-07 NOTE — ED PROVIDER NOTE - PROGRESS NOTE DETAILS
pt with focal diverticulitis, will d/c home on Cipro & Flagyl, advised to f/u with PMD u/a-neg, will d/c home

## 2019-05-07 NOTE — ED PROVIDER NOTE - OBJECTIVE STATEMENT
68 y.o. female c/o constant lower abd pain for past 2 days, nonradiating, nausea, no vomiting, last BM 2 days with Linzess, laxative, dysuria, myalgia, chills, no hematuria, urinary frequency, coughing, pt with decreased appetite,  Pt took Tylenol with relief for 4 hrs.,

## 2019-05-07 NOTE — ED PROVIDER NOTE - PMH
Ankle injury  right ankle  Chronic constipation    Diverticulosis    Gastritis    Hemorrhoids    Hernia    Hypothyroid    THORNE (nonalcoholic steatohepatitis)  stage 2  Pulmonary embolism

## 2019-05-09 LAB
CULTURE RESULTS: SIGNIFICANT CHANGE UP
SPECIMEN SOURCE: SIGNIFICANT CHANGE UP

## 2020-10-29 NOTE — PHYSICAL THERAPY INITIAL EVALUATION ADULT - PERTINENT HX OF CURRENT PROBLEM, REHAB EVAL
near syncope, cough, SOB, Pulmonary Embolism. Patient s/p Right ankle surgery on 12/20/18.
29-Oct-2020

## 2021-03-18 NOTE — ED ADULT TRIAGE NOTE - RESPIRATORY RATE (BREATHS/MIN)
This is a research note indicating that this patient has been reviewed by the site PI and is eligible to participate in the MET-Covid Study (IDS#[id number], Sarah [id number]).     Of note, this study involves the use of either metformin immediate release or placebo for 14 days after the initiation of the study. The metformin dosing is:   - 500mg on day 1;   - 500mg twice daily days 2-5;    - 500mg in the AM and 1,000mg in the PM days 6-14. (Max dose is 1,500mg per day).      A clinical team may stop the study drug at any point if they feel it should be discontinued. The patient has been advised to seek medical care as they would normally, whether they are in the study or not. This includes receiving treatments for COVID-19. They should do so as if they are in the study or not.     Please inform the study team if any such actions are taken, or if there are any questions. Study contact info: [email or web address]. Or call this number for immediate assistance: [phone].    
18

## 2021-07-28 NOTE — PHYSICAL THERAPY INITIAL EVALUATION ADULT - REFERRING PHYSICIAN, REHAB EVAL
Subjective  Chief Complaint  Cough    History of Present Illness  Farhana Freeman is a 40 y.o. female. This established patient is here today to discuss cough and right ear pain.    Cough  Chronic and ongoing. She has had a cough for at least a month. She was told at the Urgent Care that she had Bronchitis. She was treated with antibiotics two separate times. She states that her cough continues. She states that nothing has helped with her symptoms. She also states that her head feels extremely congested.    Right ear pain  Chronic and ongoing. She has been having pain in her right ear for at least a month. She states she gest a sensation that her ear is opening and closing all day long. She has been treated twice with a Z pack for Bronchitis. She still have head fullness, ear pain and a cough.    Past Medical History    Allergies: Oxycodone hcl, Flu virus vaccine, Ibuprofen, Morphine, Nsaids, Percocet [perloxx], Sulfa drugs, and Toradol  Past Medical History:   Diagnosis Date   • Anemia     Pt states she has no history of anemia   • Anesthesia     severe nausea and vomiting with morphine   • Anxiety    • ASTHMA 05/2017    Managed with PRN inhaler, last use was in March 2017   • Cholelithiasis 2017    Status post lap adeline   • Dental disorder     dentures    • GERD (gastroesophageal reflux disease)    • Heart burn     Resolved with gastric surgeries.   • Infection due to drug-resistant organism 8/20/2004    Documented in [facility] EMR in 2017.    • Kidney disease     kidney stones    • Migraine     much less since losing weight, 1-2 times per month   • Morbid obesity (HCC) 5/12/2017   • Muscle disorder    • Postural hypotension     EMR   • Psychiatric problem     anxiety    • Radicular pain of left lower extremity 5/29/2019   • Status post gastric surgery 5/29/2019    DATE OF OPERATION: 5/12/2017 POSTOPERATIVE DIAGNOSIS: Morbid Obesity with medical sequelae; Hiatal hernia OPERATION PERFORMED: 1.  Laparoscopic  Isiah-en-Y Gastric Bypass  3. Hiatal hernia repair SURGEON: Shelton Jackson MD   • Urinary bladder disorder    • Urinary incontinence      Past Surgical History:   Procedure Laterality Date   • PB ANTER COLPORRHAPHY,BLAD/VAGINA N/A 8/12/2020    Procedure: COLPORRHAPHY, ANTERIOR, TENSION FREE VAGINAL TAPE, PERINORRHAPHY;  Surgeon: Maria Elena Zhou M.D.;  Location: SURGERY SAME DAY [facility];  Service: Obstetrics   • PB POST COLPORRHAPHY,RECTUM/VAGINA N/A 8/12/2020    Procedure: COLPORRHAPHY, POSTERIOR;  Surgeon: Maria Elena Zhou M.D.;  Location: SURGERY SAME DAY [facility];  Service: Obstetrics   • VAGINAL HYSTERECTOMY TOTAL N/A 8/12/2020    Procedure: HYSTERECTOMY, TOTAL, VAGINAL;  Surgeon: Maria Elena Zhou M.D.;  Location: SURGERY SAME DAY [facility];  Service: Obstetrics   • SALPINGECTOMY Bilateral 8/12/2020    Procedure: SALPINGECTOMY;  Surgeon: Maria Elena Zhou M.D.;  Location: SURGERY SAME DAY [facility];  Service: Obstetrics   • CYSTOSCOPY N/A 8/12/2020    Procedure: CYSTOSCOPY;  Surgeon: Maria Elena Zhou M.D.;  Location: SURGERY SAME DAY [facility];  Service: Obstetrics   • LITHOTRIPSY  06/2020   • LUIS BY LAPAROSCOPY  8/9/2017    Procedure: LUIS BY LAPAROSCOPY;  Surgeon: Shelton Jackson M.D.;  Location: SURGERY [facility];  Service:    • GASTRIC BYPASS LAPAROSCOPIC  5/12/2017    Procedure: GASTRIC BYPASS LAPAROSCOPIC - ISIAH EN Y AND HIATAL HERNIA REPAIR;  Surgeon: Shelton Jackson M.D.;  Location: SURGERY [facility];  Service:    • OTHER ABDOMINAL SURGERY  10/12/15    vertical gastric sleeve     Current Outpatient Medications Ordered in Epic   Medication Sig Dispense Refill   • amoxicillin-clavulanate (AUGMENTIN) 875-125 MG Tab Take 1 tablet by mouth 2 times a day for 7 days. 14 tablet 0   • guaifenesin-codeine (ROBITUSSIN AC) Solution oral solution Take 5 mL by mouth every four hours as needed for Cough for up to 14 days. 420 mL 0   • meclizine (ANTIVERT) 25 MG Tab  Take 1 tablet by mouth 3 times a day as needed. 30 tablet 0   • meclizine (ANTIVERT) 25 MG Tab TAKE 25MG BY MOUTH EVERY 4 HOURS AS NEEDED FOR DIZZINESS     • tizanidine (ZANAFLEX) 4 MG Tab Take  by mouth 3 times a day as needed. Take 1 tablet by mouth three times a day as needed     • promethazine (PHENERGAN) 25 MG Tab Take 1 tablet by mouth every 6 hours as needed for Nausea/Vomiting. 30 tablet 0   • albuterol 108 (90 Base) MCG/ACT Aero Soln inhalation aerosol Inhale 2 Puffs every 6 hours as needed for Shortness of Breath. 8.5 g 2   • omeprazole (PRILOSEC) 40 MG delayed-release capsule Take 1 capsule by mouth every day. 90 capsule 3   • DULoxetine (CYMBALTA) 60 MG Cap DR Particles delayed-release capsule Take 1 Cap by mouth 2 times a day. Take 1 capsule by mouth daily 180 Cap 1   • Biotin 39144 MCG Tab Take 2 Tabs by mouth every morning.     • HYDROcodone-acetaminophen (NORCO) 5-325 MG Tab per tablet hydrocodone 5 mg-acetaminophen 325 mg tablet       No current Epic-ordered facility-administered medications on file.     Family History:    Family History   Problem Relation Age of Onset   • Kidney Disease Mother         Stage 3-4   • Hypertension Mother    • Other Mother         cervical dysplasia treated by cryo   • Diabetes Father    • Heart Attack Father 61        Status post triple bypass   • Hypertension Father    • Cancer Cousin 21        Benign breast cancer   • Breast Cancer Other         Maternal grandmother's sister      Personal/Social History:    Social History     Tobacco Use   • Smoking status: Never Smoker   • Smokeless tobacco: Never Used   Vaping Use   • Vaping Use: Never used   Substance Use Topics   • Alcohol use: Yes     Comment: Rarely   • Drug use: No     Social History     Social History Narrative    Lives in a house in [place] with her , has 2 biological children, has custody of her niece, reports managing fine at home.    Has cats, dogs, and lizards as pets.    Works a warehouse job, is  "on her feet for most of the day.      Consumes a healthy diet consisting of fruits, vegetables, white meats and home cooked meals.     Walks at least 10,000 steps per day currently.       Review of Systems:   General: Negative for fever/chills and expected weight change,.   Eyes:  Negative for vision changes, eye pain.  ENT:  Negative for hearing changes, sore throat, and neck pain.  Positive for ear pain, cough and congestion.  Respiratory:  Negative for cough and dyspnea.    Cardiovascular:  Negative for chest pain and palpitations.  Gastrointestinal:  Negative for nausea/vomiting, changes in bowel habits, and abdominal pain.   Genitourinary:  Negative for dysuria and hematuria.   Musculoskeletal:  Negative for myalgias.   Skin:  Negative for rash.   Neurological:  Negative for numbness/tingling and headaches.   Heme/Lymph:  Does not bruise/bleed easily.     Objective  Physical Exam:   /62 (BP Location: Left arm, Patient Position: Sitting, BP Cuff Size: Large adult)   Pulse 80   Temp 36.7 °C (98.1 °F) (Temporal)   Resp 16   Ht 1.6 m (5' 3\")   Wt 94.8 kg (209 lb)   SpO2 98%  Body mass index is 37.02 kg/m².  General:  Alert and oriented.  Well appearing.  NAD  Neck: Supple without JVD. No lymphadenopathy.  ENT:  Right tympanic membrane bulging with fluid behind TM. Frontal sinuses tender to palpitation.  Pulmonary:  Normal effort.  Clear to ausculation without rales, ronchi, or wheezing.  Cardiovascular:  Regular rate and rhythm without murmur, rubs or gallop.   Skin:  Warm and dry.  No obvious lesions.  Musculoskeletal:  No extremity cyanosis, clubbing, or edema.      Assessment/Plan  1. Cough  Chronic and ongoing.  A coworker did test positive for COVID last week. Will test her because of her presenting symptoms.  Discussed using Guaifenesin-Codeine cough syrup. She has used this medication in the past and has not had any reactions to it. Discussed her allergies and she is okay with starting the " Zara Rodriguez medication.  - SARS-CoV-2 PCR (24 hour In-House): Collect NP swab in VTM; Future  - amoxicillin-clavulanate (AUGMENTIN) 875-125 MG Tab; Take 1 tablet by mouth 2 times a day for 7 days.  Dispense: 14 tablet; Refill: 0  - guaifenesin-codeine (ROBITUSSIN AC) Solution oral solution; Take 5 mL by mouth every four hours as needed for Cough for up to 14 days.  Dispense: 420 mL; Refill: 0    2. Right ear pain  3. Other chronic nonsuppurative otitis media of right ear  Chronic and ongoing.  She states that her right ear has has pain on and off for the past month. She states that she feels as though her ear is opening and closing.  Discussed that her examination presents as an ear infection. Discussed that previous Z packs might not have been the right medication for her symptoms.  Take Augmentin twice a day for 7 days.  Educated to finish the entire course of antibiotics even if she starts to feel better.  - amoxicillin-clavulanate (AUGMENTIN) 875-125 MG Tab; Take 1 tablet by mouth 2 times a day for 7 days.  Dispense: 14 tablet; Refill: 0      Health Maintenance: Completed    Return if symptoms worsen or fail to improve.    I have placed the above orders and discussed them with an approved delegating provider.  The MA is performing the above orders under the direction of Dr. Jef Mclain MD/DO.    Please note that this dictation was created using voice recognition software. I have made every reasonable attempt to correct obvious errors, but I expect that there are errors of grammar and possibly content that I did not discover before finalizing the note.    DIMAS Duncan  [facility]

## 2022-04-11 ENCOUNTER — TRANSCRIPTION ENCOUNTER (OUTPATIENT)
Age: 72
End: 2022-04-11

## 2022-05-13 ENCOUNTER — EMERGENCY (EMERGENCY)
Facility: HOSPITAL | Age: 72
LOS: 1 days | Discharge: HOME | End: 2022-05-13
Attending: STUDENT IN AN ORGANIZED HEALTH CARE EDUCATION/TRAINING PROGRAM
Payer: MEDICARE

## 2022-05-13 VITALS
HEART RATE: 56 BPM | DIASTOLIC BLOOD PRESSURE: 59 MMHG | OXYGEN SATURATION: 99 % | TEMPERATURE: 98 F | SYSTOLIC BLOOD PRESSURE: 113 MMHG | RESPIRATION RATE: 18 BRPM

## 2022-05-13 VITALS
HEART RATE: 62 BPM | DIASTOLIC BLOOD PRESSURE: 70 MMHG | RESPIRATION RATE: 18 BRPM | OXYGEN SATURATION: 96 % | SYSTOLIC BLOOD PRESSURE: 112 MMHG | WEIGHT: 195.99 LBS | TEMPERATURE: 100 F | HEIGHT: 61 IN

## 2022-05-13 DIAGNOSIS — Z98.890 OTHER SPECIFIED POSTPROCEDURAL STATES: Chronic | ICD-10-CM

## 2022-05-13 DIAGNOSIS — Z90.710 ACQUIRED ABSENCE OF BOTH CERVIX AND UTERUS: Chronic | ICD-10-CM

## 2022-05-13 DIAGNOSIS — Z98.891 HISTORY OF UTERINE SCAR FROM PREVIOUS SURGERY: Chronic | ICD-10-CM

## 2022-05-13 PROBLEM — I26.99 OTHER PULMONARY EMBOLISM WITHOUT ACUTE COR PULMONALE: Chronic | Status: ACTIVE | Noted: 2019-05-07

## 2022-05-13 LAB
ALBUMIN SERPL ELPH-MCNC: 3.1 G/DL — LOW (ref 3.5–5)
ALP SERPL-CCNC: 105 U/L — SIGNIFICANT CHANGE UP (ref 40–120)
ALT FLD-CCNC: 21 U/L DA — SIGNIFICANT CHANGE UP (ref 10–60)
ANION GAP SERPL CALC-SCNC: 6 MMOL/L — SIGNIFICANT CHANGE UP (ref 5–17)
AST SERPL-CCNC: 18 U/L — SIGNIFICANT CHANGE UP (ref 10–40)
BASOPHILS # BLD AUTO: 0.05 K/UL — SIGNIFICANT CHANGE UP (ref 0–0.2)
BASOPHILS NFR BLD AUTO: 0.8 % — SIGNIFICANT CHANGE UP (ref 0–2)
BILIRUB SERPL-MCNC: 0.2 MG/DL — SIGNIFICANT CHANGE UP (ref 0.2–1.2)
BUN SERPL-MCNC: 7 MG/DL — SIGNIFICANT CHANGE UP (ref 7–18)
CALCIUM SERPL-MCNC: 9.4 MG/DL — SIGNIFICANT CHANGE UP (ref 8.4–10.5)
CHLORIDE SERPL-SCNC: 102 MMOL/L — SIGNIFICANT CHANGE UP (ref 96–108)
CO2 SERPL-SCNC: 25 MMOL/L — SIGNIFICANT CHANGE UP (ref 22–31)
CREAT SERPL-MCNC: 0.76 MG/DL — SIGNIFICANT CHANGE UP (ref 0.5–1.3)
EGFR: 83 ML/MIN/1.73M2 — SIGNIFICANT CHANGE UP
EOSINOPHIL # BLD AUTO: 0.37 K/UL — SIGNIFICANT CHANGE UP (ref 0–0.5)
EOSINOPHIL NFR BLD AUTO: 6 % — SIGNIFICANT CHANGE UP (ref 0–6)
GLUCOSE SERPL-MCNC: 96 MG/DL — SIGNIFICANT CHANGE UP (ref 70–99)
HCT VFR BLD CALC: 35.5 % — SIGNIFICANT CHANGE UP (ref 34.5–45)
HGB BLD-MCNC: 11.9 G/DL — SIGNIFICANT CHANGE UP (ref 11.5–15.5)
IMM GRANULOCYTES NFR BLD AUTO: 0.2 % — SIGNIFICANT CHANGE UP (ref 0–1.5)
LYMPHOCYTES # BLD AUTO: 0.97 K/UL — LOW (ref 1–3.3)
LYMPHOCYTES # BLD AUTO: 15.7 % — SIGNIFICANT CHANGE UP (ref 13–44)
MAGNESIUM SERPL-MCNC: 2 MG/DL — SIGNIFICANT CHANGE UP (ref 1.6–2.6)
MCHC RBC-ENTMCNC: 29 PG — SIGNIFICANT CHANGE UP (ref 27–34)
MCHC RBC-ENTMCNC: 33.5 GM/DL — SIGNIFICANT CHANGE UP (ref 32–36)
MCV RBC AUTO: 86.4 FL — SIGNIFICANT CHANGE UP (ref 80–100)
MONOCYTES # BLD AUTO: 0.76 K/UL — SIGNIFICANT CHANGE UP (ref 0–0.9)
MONOCYTES NFR BLD AUTO: 12.3 % — SIGNIFICANT CHANGE UP (ref 2–14)
NEUTROPHILS # BLD AUTO: 4 K/UL — SIGNIFICANT CHANGE UP (ref 1.8–7.4)
NEUTROPHILS NFR BLD AUTO: 65 % — SIGNIFICANT CHANGE UP (ref 43–77)
NRBC # BLD: 0 /100 WBCS — SIGNIFICANT CHANGE UP (ref 0–0)
PLATELET # BLD AUTO: 266 K/UL — SIGNIFICANT CHANGE UP (ref 150–400)
POTASSIUM SERPL-MCNC: 4.5 MMOL/L — SIGNIFICANT CHANGE UP (ref 3.5–5.3)
POTASSIUM SERPL-SCNC: 4.5 MMOL/L — SIGNIFICANT CHANGE UP (ref 3.5–5.3)
PROT SERPL-MCNC: 7.1 G/DL — SIGNIFICANT CHANGE UP (ref 6–8.3)
RAPID RVP RESULT: DETECTED
RBC # BLD: 4.11 M/UL — SIGNIFICANT CHANGE UP (ref 3.8–5.2)
RBC # FLD: 14.1 % — SIGNIFICANT CHANGE UP (ref 10.3–14.5)
SARS-COV-2 RNA SPEC QL NAA+PROBE: DETECTED
SODIUM SERPL-SCNC: 133 MMOL/L — LOW (ref 135–145)
WBC # BLD: 6.16 K/UL — SIGNIFICANT CHANGE UP (ref 3.8–10.5)
WBC # FLD AUTO: 6.16 K/UL — SIGNIFICANT CHANGE UP (ref 3.8–10.5)

## 2022-05-13 PROCEDURE — 85025 COMPLETE CBC W/AUTO DIFF WBC: CPT

## 2022-05-13 PROCEDURE — 0225U NFCT DS DNA&RNA 21 SARSCOV2: CPT

## 2022-05-13 PROCEDURE — 80053 COMPREHEN METABOLIC PANEL: CPT

## 2022-05-13 PROCEDURE — 99284 EMERGENCY DEPT VISIT MOD MDM: CPT

## 2022-05-13 PROCEDURE — 71045 X-RAY EXAM CHEST 1 VIEW: CPT

## 2022-05-13 PROCEDURE — 36415 COLL VENOUS BLD VENIPUNCTURE: CPT

## 2022-05-13 PROCEDURE — 71045 X-RAY EXAM CHEST 1 VIEW: CPT | Mod: 26

## 2022-05-13 PROCEDURE — 83735 ASSAY OF MAGNESIUM: CPT

## 2022-05-13 PROCEDURE — 99285 EMERGENCY DEPT VISIT HI MDM: CPT | Mod: 25

## 2022-05-13 RX ORDER — SODIUM CHLORIDE 9 MG/ML
1000 INJECTION INTRAMUSCULAR; INTRAVENOUS; SUBCUTANEOUS ONCE
Refills: 0 | Status: COMPLETED | OUTPATIENT
Start: 2022-05-13 | End: 2022-05-13

## 2022-05-13 RX ORDER — ACETAMINOPHEN 500 MG
975 TABLET ORAL ONCE
Refills: 0 | Status: COMPLETED | OUTPATIENT
Start: 2022-05-13 | End: 2022-05-13

## 2022-05-13 RX ADMIN — SODIUM CHLORIDE 1000 MILLILITER(S): 9 INJECTION INTRAMUSCULAR; INTRAVENOUS; SUBCUTANEOUS at 14:17

## 2022-05-13 RX ADMIN — Medication 975 MILLIGRAM(S): at 14:16

## 2022-05-13 NOTE — ED PROVIDER NOTE - NSFOLLOWUPINSTRUCTIONS_ED_ALL_ED_FT
You were seen in the emergency department for: cough, sore throat  Your diagnosis for this visit was: viral infection  Please rest and stay hydrated.    You may be contacted by the Emergency Department Referrals Coordinator to set up your follow-up appointment within 24-48 hours of your discharge, Monday to Friday. We recommend you follow up with: your primary care doctor.    Please return to the Emergency Department if you experience any of the following symptoms:   - Shortness of breath or trouble breathing  - Pressure, pain or tightness in the chest  - Face drooping, arm weakness or speech difficulty  - Persistence of severe vomiting  - Head injury or loss of consciousness  - Nonstop bleeding or an open wound    (1) Follow up with your primary care physician within the next 24-48 hours as discussed. In addition, we did not find evidence of a life threatening illness on your testing here today, but listed below are the specialists that will be necessary to see as an outpatient to continue the workup.  Please call the numbers listed below or [phone] to set up the necessary appointments.  (2) Take Tylenol (up to 1000mg or 1 g)  and/or Motrin (up to 600mg) up to every 6 hours as needed for pain.   (3) If you had an IV (intravenous) line placed, it was removed. Sometimes, after IV removal, that area can be tender for a few days; if it develops redness and swelling, those could be signs of infection; in which case, return to the Emergency Department for assessment.  (4) Please continue taking all of your home medications as directed.

## 2022-05-13 NOTE — ED ADULT NURSE NOTE - CHPI ED NUR SYMPTOMS POS
* No procedures listed *. 
 
epidural 
 
Anesthesia Post Evaluation Patient location during evaluation: bedside Patient participation: complete - patient participated Level of consciousness: awake and alert Pain score: 1 Pain management: adequate Airway patency: patent Anesthetic complications: no 
Cardiovascular status: hemodynamically stable Respiratory status: unassisted, room air and spontaneous ventilation Hydration status: euvolemic Post anesthesia nausea and vomiting:  none Vitals Value Taken Time /68 5/6/2019  5:18 PM  
Temp Pulse 101 5/6/2019  5:18 PM  
Resp SpO2 Vitals shown include unvalidated device data.
HEADACHE/PAIN

## 2022-05-13 NOTE — ED PROVIDER NOTE - ENMT, MLM
Airway patent, Nasal mucosa clear. posterior oropharyngeal erythema but no exudates. Throat has no vesicles, no oropharyngeal exudates and uvula is midline.

## 2022-05-13 NOTE — ED PROVIDER NOTE - OBJECTIVE STATEMENT
71-year-old female hx of HTN, HLD, hypothyroidism, prior PE, presenting with subejctive fevers, cough, sore throat for 3 days. No chest pain or SOB. COVID vaccinated x4. No leg swelling. PE was 4 years ago, possibly provoked as it was after ortho surgery on her RLE. No other symptoms.

## 2022-05-13 NOTE — ED PROVIDER NOTE - CLINICAL SUMMARY MEDICAL DECISION MAKING FREE TEXT BOX
71-year-old female hx of HTN, HLD, hypothyroidism, prior PE, presenting with subejctive fevers, cough, sore throat for 3 days. Will check labs, RVP, CXR, provide IVF/Tylenol, and reassess.

## 2022-05-13 NOTE — ED PROVIDER NOTE - PATIENT PORTAL LINK FT
You can access the FollowMyHealth Patient Portal offered by [facility] by registering at the following website: [email or web address]. By joining [employer]’s FollowMyHealth portal, you will also be able to view your health information using other applications (apps) compatible with our system.

## 2022-05-13 NOTE — ED PROVIDER NOTE - PROGRESS NOTE DETAILS
Labs, CXR normal.  Patient feels better after fluids.   Wants to go home although RVP pending. Will dc.
EKG/Imaging Studies/Medications/Labs

## 2022-11-11 ENCOUNTER — NON-APPOINTMENT (OUTPATIENT)
Age: 72
End: 2022-11-11

## 2022-11-15 ENCOUNTER — EMERGENCY (EMERGENCY)
Facility: HOSPITAL | Age: 72
LOS: 1 days | Discharge: HOME | End: 2022-11-15
Attending: EMERGENCY MEDICINE
Payer: MEDICARE

## 2022-11-15 VITALS
OXYGEN SATURATION: 96 % | TEMPERATURE: 99 F | SYSTOLIC BLOOD PRESSURE: 133 MMHG | RESPIRATION RATE: 19 BRPM | HEART RATE: 63 BPM | DIASTOLIC BLOOD PRESSURE: 75 MMHG | WEIGHT: 192.02 LBS | HEIGHT: 61 IN

## 2022-11-15 DIAGNOSIS — Z90.710 ACQUIRED ABSENCE OF BOTH CERVIX AND UTERUS: Chronic | ICD-10-CM

## 2022-11-15 DIAGNOSIS — Z98.890 OTHER SPECIFIED POSTPROCEDURAL STATES: Chronic | ICD-10-CM

## 2022-11-15 DIAGNOSIS — Z98.891 HISTORY OF UTERINE SCAR FROM PREVIOUS SURGERY: Chronic | ICD-10-CM

## 2022-11-15 LAB
RAPID RVP RESULT: DETECTED
RV+EV RNA SPEC QL NAA+PROBE: DETECTED
SARS-COV-2 RNA SPEC QL NAA+PROBE: SIGNIFICANT CHANGE UP

## 2022-11-15 PROCEDURE — 71046 X-RAY EXAM CHEST 2 VIEWS: CPT

## 2022-11-15 PROCEDURE — 71046 X-RAY EXAM CHEST 2 VIEWS: CPT | Mod: 26

## 2022-11-15 PROCEDURE — 99284 EMERGENCY DEPT VISIT MOD MDM: CPT

## 2022-11-15 PROCEDURE — 0225U NFCT DS DNA&RNA 21 SARSCOV2: CPT

## 2022-11-15 PROCEDURE — 99283 EMERGENCY DEPT VISIT LOW MDM: CPT | Mod: 25

## 2022-11-15 RX ORDER — ACETAMINOPHEN 500 MG
650 TABLET ORAL ONCE
Refills: 0 | Status: COMPLETED | OUTPATIENT
Start: 2022-11-15 | End: 2022-11-15

## 2022-11-15 RX ORDER — ACETAMINOPHEN 500 MG
2 TABLET ORAL
Qty: 40 | Refills: 0
Start: 2022-11-15

## 2022-11-15 NOTE — ED PROVIDER NOTE - CLINICAL SUMMARY MEDICAL DECISION MAKING FREE TEXT BOX
72-year-old female with muscular pain in the setting of URI.  Plan for chest x-ray which shows clear lungs.  Supportive care.  DC with outpatient follow-up.  Discussed indication for patient return to ED.  Patient understood.

## 2022-11-15 NOTE — ED PROVIDER NOTE - PATIENT PORTAL LINK FT
You can access the FollowMyHealth Patient Portal offered by [facility] by registering at the following website: [email or web address]. By joining [facility]’s FollowMyHealth portal, you will also be able to view your health information using other applications (apps) compatible with our system.

## 2022-11-15 NOTE — ED ADULT NURSE NOTE - NSIMPLEMENTINTERV_GEN_ALL_ED
Implemented All Universal Safety Interventions:  [place] to call system. Call bell, personal items and telephone within reach. Instruct patient to call for assistance. Room bathroom lighting operational. Non-slip footwear when patient is off stretcher. Physically safe environment: no spills, clutter or unnecessary equipment. Stretcher in lowest position, wheels locked, appropriate side rails in place.

## 2022-11-15 NOTE — ED PROVIDER NOTE - OBJECTIVE STATEMENT
72-year-old female past medical history of hypertension, hyperlipidemia, hypothyroid, previously provoked PE in the 1980s, presents with flulike symptoms x1 week.  Went to urgent care and had chest x-ray showing pneumonia and just completed azithromycin course.  Presents to ED today because she is having pain in her bilateral neck and thoracic area worse when she coughs.  Denies other acute complaints.  Indian translation via family at bedside.

## 2022-11-15 NOTE — ED PROVIDER NOTE - PHYSICAL EXAMINATION
GENERAL: well appearing, no acute distress, Intermittent coughing  HEAD: atraumatic   EYES: EOMI   ENT: moist oral mucosa   CARDIAC: regular rate  RESPIRATORY: no increased work of breathing, Lungs clear  MUSCULOSKELETAL: no deformity, Mild cervical and thoracic paraspinal tenderness to palpation  NEUROLOGICAL: alert, spontaneous movement of extremities   SKIN: no visible rash  PSYCHIATRIC: cooperative

## 2023-01-22 NOTE — ED ADULT NURSE NOTE - CAS EDN DISCHARGE INTERVENTIONS
64yof HTN, DM, HLD who presented to the ED initially for dysuria. Patient states that 1/20 in the AM, she had burning with urination, lower abdominal heaviness and lower abdominal/suprapubic pain, admitted for UTI and lactic acidosis.     Cystitis.   - UA + for leuk esterase w/ wbcs and sxs  -started on ceftriaxone (1/20 - )  -f/u UCx  -pyridium for symptoms.    vagianal yeast infection likely contributing to symptoms of vaginal itching, dysuria and dyspyrunia  - diflucan 150 mg x 1    Lactic acidosis.   -  Lactate 2.6 on vbg but 1.7 on serum blood work  -initially thought to be due to either metformin vs infection  -c/w ivf for now  -repeat serum lactate in AM is normal 1.2    Diabetes mellitus with hyperglycemia.   - A1c 9.1, poorly controlled   -c/w lantus 50 qD  -nutrition consult  -consider endo consult if not controlled  -holding home meds.     Hypertension.   -  c/w lisinopril 5, amlodipine 2.5, metoprolol 25 qD.     Hyperlipidemia.   -  c/w simvastatin 10.     Anemia.   -  unclear etiology  -obtain iron studies for Am.     Need for prophylactic measure.   -  diet: cc diet  dvt ppx: encourage ambulation  dispo: can be discharged    
IV discontinued, cath removed intact

## 2023-03-07 NOTE — ED ADULT NURSE NOTE - CHIEF COMPLAINT QUOTE
Lower abd pain,( h/o PE on po anticoagulant, also has h/o diverticulosis) body ache since 2 days Initial (On Arrival)

## 2023-06-02 ENCOUNTER — NON-APPOINTMENT (OUTPATIENT)
Age: 73
End: 2023-06-02

## 2024-02-15 NOTE — PATIENT PROFILE ADULT - NSPROEDALEARNPREFOTH_GEN_A_NUR
Patient is informed and request that I call her at a later date closer to when she needs the repeat ultrasound done.   written material/verbal instruction

## 2024-11-07 NOTE — ED ADULT TRIAGE NOTE - TEMPERATURE IN CELSIUS (DEGREES C)
Patient came to clinic for anticoagulation monitoring.  Last INR on 10/22/24 was 3.0.  Dose maintained.   Today's INR is 2.9 and is within goal range.    Current warfarin total weekly dose of 50 mg verified.  Informed the INR result is within therapeutic range and instructed to maintain current dose per protocol. Discussed dose and return date of 12/5/24 (4 weeks) for next INR. See Anticoagulation flowsheet.    Dr. Puri is in the office today supervising the treatment.    Instructed to contact the clinic with any unusual bleeding or bruising, any changes in medications, diet, health status, lifestyle, or any other changes, questions or concerns. Verbalized understanding of all discussed.     Referring Provider  Dr. Martins  Renewal Expires  10/16/25  
37.6

## 2025-02-09 NOTE — ED ADULT NURSE NOTE - NS ED PATIENT SAFETY CONCERN
A1c stable at 6.3, no changes to medication. Repeat in six months.     Urine microalbuimin, CBC (White and Red blood cells), CMP (Liver, kidneys, sodium, potassium, calcium),  Lipid panel (Cholesterol) and TSH (Thyroid) unremarkable, continue current regimen, thank you.    No

## 2025-05-27 NOTE — H&P ADULT - PROBLEM SELECTOR PLAN 7
Mom notified of 3mg melatonin dose.   
IMPROVE VTE Individual Risk Assessment          RISK                                                          Points  [  ] Previous VTE                                                3  [  ] Thrombophilia                                             2  [ x ] Lower limb paralysis                                   2        (unable to hold up >15 seconds)    [  ] Current Cancer                                             2         (within 6 months)  [ x ] Immobilization > 24 hrs                              1  [  ] ICU/CCU stay > 24 hours                             1  [ x ] Age > 60                                                         1    IMPROVE VTE Score: 4    c/w heparin subQ for vte prophylaxis